# Patient Record
Sex: MALE | Race: BLACK OR AFRICAN AMERICAN | Employment: UNEMPLOYED | URBAN - METROPOLITAN AREA
[De-identification: names, ages, dates, MRNs, and addresses within clinical notes are randomized per-mention and may not be internally consistent; named-entity substitution may affect disease eponyms.]

---

## 2019-03-10 ENCOUNTER — HOSPITAL ENCOUNTER (EMERGENCY)
Age: 24
Discharge: HOME | End: 2019-03-10
Attending: EMERGENCY MEDICINE
Payer: SELF-PAY

## 2019-03-10 ENCOUNTER — APPOINTMENT (OUTPATIENT)
Dept: CT IMAGING | Age: 24
End: 2019-03-10
Attending: EMERGENCY MEDICINE
Payer: SELF-PAY

## 2019-03-10 VITALS
OXYGEN SATURATION: 100 % | HEART RATE: 83 BPM | TEMPERATURE: 99 F | RESPIRATION RATE: 15 BRPM | DIASTOLIC BLOOD PRESSURE: 74 MMHG | SYSTOLIC BLOOD PRESSURE: 129 MMHG

## 2019-03-10 DIAGNOSIS — S09.90XA CLOSED HEAD INJURY, INITIAL ENCOUNTER: ICD-10-CM

## 2019-03-10 DIAGNOSIS — R56.9 SEIZURE (HCC): Primary | ICD-10-CM

## 2019-03-10 LAB
ALBUMIN SERPL-MCNC: 4.1 G/DL (ref 3.5–5)
ALBUMIN/GLOB SERPL: 1 {RATIO} (ref 1.1–2.2)
ALP SERPL-CCNC: 53 U/L (ref 45–117)
ALT SERPL-CCNC: 40 U/L (ref 12–78)
AMPHET UR QL SCN: NEGATIVE
ANION GAP SERPL CALC-SCNC: 14 MMOL/L (ref 5–15)
APPEARANCE UR: CLEAR
AST SERPL-CCNC: 31 U/L (ref 15–37)
BACTERIA URNS QL MICRO: NEGATIVE /HPF
BARBITURATES UR QL SCN: NEGATIVE
BASOPHILS # BLD: 0 K/UL (ref 0–0.1)
BASOPHILS NFR BLD: 0 % (ref 0–1)
BENZODIAZ UR QL: NEGATIVE
BILIRUB SERPL-MCNC: 0.4 MG/DL (ref 0.2–1)
BILIRUB UR QL: NEGATIVE
BUN SERPL-MCNC: 11 MG/DL (ref 6–20)
BUN/CREAT SERPL: 10 (ref 12–20)
CALCIUM SERPL-MCNC: 9 MG/DL (ref 8.5–10.1)
CANNABINOIDS UR QL SCN: POSITIVE
CHLORIDE SERPL-SCNC: 108 MMOL/L (ref 97–108)
CK SERPL-CCNC: 205 U/L (ref 39–308)
CO2 SERPL-SCNC: 18 MMOL/L (ref 21–32)
COCAINE UR QL SCN: NEGATIVE
COLOR UR: ABNORMAL
CREAT SERPL-MCNC: 1.12 MG/DL (ref 0.7–1.3)
DIFFERENTIAL METHOD BLD: ABNORMAL
DRUG SCRN COMMENT,DRGCM: ABNORMAL
EOSINOPHIL # BLD: 0.1 K/UL (ref 0–0.4)
EOSINOPHIL NFR BLD: 1 % (ref 0–7)
EPITH CASTS URNS QL MICRO: ABNORMAL /LPF
ERYTHROCYTE [DISTWIDTH] IN BLOOD BY AUTOMATED COUNT: 12.8 % (ref 11.5–14.5)
GLOBULIN SER CALC-MCNC: 4.1 G/DL (ref 2–4)
GLUCOSE SERPL-MCNC: 109 MG/DL (ref 65–100)
GLUCOSE UR STRIP.AUTO-MCNC: NEGATIVE MG/DL
HCT VFR BLD AUTO: 47.4 % (ref 36.6–50.3)
HGB BLD-MCNC: 15.8 G/DL (ref 12.1–17)
HGB UR QL STRIP: ABNORMAL
HYALINE CASTS URNS QL MICRO: ABNORMAL /LPF (ref 0–5)
IMM GRANULOCYTES # BLD AUTO: 0 K/UL (ref 0–0.04)
IMM GRANULOCYTES NFR BLD AUTO: 0 % (ref 0–0.5)
KETONES UR QL STRIP.AUTO: NEGATIVE MG/DL
LEUKOCYTE ESTERASE UR QL STRIP.AUTO: NEGATIVE
LYMPHOCYTES # BLD: 3.1 K/UL (ref 0.8–3.5)
LYMPHOCYTES NFR BLD: 48 % (ref 12–49)
MAGNESIUM SERPL-MCNC: 2.4 MG/DL (ref 1.6–2.4)
MCH RBC QN AUTO: 27.9 PG (ref 26–34)
MCHC RBC AUTO-ENTMCNC: 33.3 G/DL (ref 30–36.5)
MCV RBC AUTO: 83.6 FL (ref 80–99)
METAMYELOCYTES NFR BLD MANUAL: 1 %
METHADONE UR QL: NEGATIVE
MONOCYTES # BLD: 0.1 K/UL (ref 0–1)
MONOCYTES NFR BLD: 2 % (ref 5–13)
MYELOCYTES NFR BLD MANUAL: 2 %
NEUTS SEG # BLD: 2.9 K/UL (ref 1.8–8)
NEUTS SEG NFR BLD: 46 % (ref 32–75)
NITRITE UR QL STRIP.AUTO: NEGATIVE
NRBC # BLD: 0 K/UL (ref 0–0.01)
NRBC BLD-RTO: 0 PER 100 WBC
OPIATES UR QL: NEGATIVE
PCP UR QL: NEGATIVE
PH UR STRIP: 5.5 [PH] (ref 5–8)
PLATELET # BLD AUTO: 355 K/UL (ref 150–400)
PMV BLD AUTO: 9 FL (ref 8.9–12.9)
POTASSIUM SERPL-SCNC: 4.5 MMOL/L (ref 3.5–5.1)
PROT SERPL-MCNC: 8.2 G/DL (ref 6.4–8.2)
PROT UR STRIP-MCNC: 30 MG/DL
RBC # BLD AUTO: 5.67 M/UL (ref 4.1–5.7)
RBC #/AREA URNS HPF: ABNORMAL /HPF (ref 0–5)
RBC MORPH BLD: ABNORMAL
SODIUM SERPL-SCNC: 140 MMOL/L (ref 136–145)
SP GR UR REFRACTOMETRY: 1.02 (ref 1–1.03)
TROPONIN I SERPL-MCNC: <0.05 NG/ML
UROBILINOGEN UR QL STRIP.AUTO: 1 EU/DL (ref 0.2–1)
WBC # BLD AUTO: 6.4 K/UL (ref 4.1–11.1)
WBC URNS QL MICRO: ABNORMAL /HPF (ref 0–4)

## 2019-03-10 PROCEDURE — 83735 ASSAY OF MAGNESIUM: CPT

## 2019-03-10 PROCEDURE — 85025 COMPLETE CBC W/AUTO DIFF WBC: CPT

## 2019-03-10 PROCEDURE — 84484 ASSAY OF TROPONIN QUANT: CPT

## 2019-03-10 PROCEDURE — 82550 ASSAY OF CK (CPK): CPT

## 2019-03-10 PROCEDURE — 99284 EMERGENCY DEPT VISIT MOD MDM: CPT

## 2019-03-10 PROCEDURE — 80053 COMPREHEN METABOLIC PANEL: CPT

## 2019-03-10 PROCEDURE — 70450 CT HEAD/BRAIN W/O DYE: CPT

## 2019-03-10 PROCEDURE — 81001 URINALYSIS AUTO W/SCOPE: CPT

## 2019-03-10 PROCEDURE — 96360 HYDRATION IV INFUSION INIT: CPT

## 2019-03-10 PROCEDURE — 36415 COLL VENOUS BLD VENIPUNCTURE: CPT

## 2019-03-10 PROCEDURE — 80307 DRUG TEST PRSMV CHEM ANLYZR: CPT

## 2019-03-10 PROCEDURE — 74011250636 HC RX REV CODE- 250/636: Performed by: EMERGENCY MEDICINE

## 2019-03-10 RX ADMIN — SODIUM CHLORIDE 1000 ML: 900 INJECTION, SOLUTION INTRAVENOUS at 13:13

## 2019-03-10 NOTE — ED NOTES
Pt presents to ED via EMS c/o \"6 minute\" first time seizure witnessed by his family. Pt was sitting ont he couch and slid to the floor during seizure. Pt c/o \"viral symptoms\" and one episode of nausea last week but other that has no other medical hx. Pt c/o posterior left sided head pain. Monitor x 3. Seizure precautions in place.

## 2019-03-10 NOTE — ED PROVIDER NOTES
EMERGENCY DEPARTMENT HISTORY AND PHYSICAL EXAM      Date: 3/10/2019  Patient Name: Sari Knox    History of Presenting Illness     Chief Complaint   Patient presents with    Seizure       History Provided By: Patient and Patient's Mother    HPI: Sari Knox, 21 y.o. male presents via EMS to the ED with cc of acute onset seizure, new onset today, witnessed by mother at pt's household ~1 hour pta. Mother reports pt fell on floor, began shaking his head and banging in on floor, and tremors radiated to lower extremities. Mother confirms pt bit tongue during episode. Pt reports feeling normal prior to episode. Pt reports he had abdominal pain with nausea/vomiting, onset 03/03/2019, and has had intermittent abdominal pain throughout past week. Pt reports having diarrhea on 03/09/2019. Pt also c/o 3/10 posterior HA, onset s/p seizure. He also reports having mild nausea, but pt thinks it is related to past abdominal pain and not seizure. Mother reports it took pt several minutes to become A&O x4 once again after seizure. Pt reports he smokes cigarettes every day. Pt denies neck pain, numbness/tingling, dizziness, lightheadedness, or blood thinner use. There are no other complaints, changes, or physical findings at this time. PCP: None    No current facility-administered medications on file prior to encounter. No current outpatient medications on file prior to encounter. Past History     Past Medical History:  No past medical history on file. Past Surgical History:  No past surgical history on file. Family History:  No family history on file. Social History:  Social History     Tobacco Use    Smoking status: Not on file   Substance Use Topics    Alcohol use: Not on file    Drug use: Not on file       Allergies:  No Known Allergies      Review of Systems   Review of Systems   Constitutional: Negative for fever. HENT: Negative for congestion. Eyes: Negative.     Respiratory: Negative for shortness of breath. Cardiovascular: Negative for chest pain. Gastrointestinal: Negative for abdominal pain. Endocrine: Negative for heat intolerance. Genitourinary: Negative for flank pain. Musculoskeletal: Negative for neck pain. Skin: Negative for rash. Allergic/Immunologic: Negative for immunocompromised state. Neurological: Positive for seizures and headaches. Negative for dizziness, light-headedness and numbness. Hematological: Does not bruise/bleed easily. Psychiatric/Behavioral: Negative. All other systems reviewed and are negative. Physical Exam   Physical Exam   Constitutional: He is oriented to person, place, and time. He appears well-developed and well-nourished. No distress. No acute distress   HENT:   Head: Normocephalic and atraumatic. Evidence tongue biting on left side   Eyes: EOM are normal. Pupils are equal, round, and reactive to light. Neck: Normal range of motion. Neck supple. Cardiovascular: Regular rhythm and normal heart sounds. Tachycardia present. Pulmonary/Chest: Effort normal and breath sounds normal. He has no wheezes. Abdominal: Soft. Bowel sounds are normal. There is no tenderness. Musculoskeletal: Normal range of motion. He exhibits no edema or tenderness. Neck non-tender; left occipital tenderness   Neurological: He is alert and oriented to person, place, and time. No cranial nerve deficit. Sensory and motor skills intact   Skin: Skin is warm and dry. Psychiatric: He has a normal mood and affect. His behavior is normal.   Nursing note and vitals reviewed.       Diagnostic Study Results     Labs -     Recent Results (from the past 12 hour(s))   CBC WITH AUTOMATED DIFF    Collection Time: 03/10/19 12:49 PM   Result Value Ref Range    WBC 6.4 4.1 - 11.1 K/uL    RBC 5.67 4.10 - 5.70 M/uL    HGB 15.8 12.1 - 17.0 g/dL    HCT 47.4 36.6 - 50.3 %    MCV 83.6 80.0 - 99.0 FL    MCH 27.9 26.0 - 34.0 PG    MCHC 33.3 30.0 - 36.5 g/dL RDW 12.8 11.5 - 14.5 %    PLATELET 257 780 - 768 K/uL    MPV 9.0 8.9 - 12.9 FL    NRBC 0.0 0  WBC    ABSOLUTE NRBC 0.00 0.00 - 0.01 K/uL    NEUTROPHILS 46 32 - 75 %    LYMPHOCYTES 48 12 - 49 %    MONOCYTES 2 (L) 5 - 13 %    EOSINOPHILS 1 0 - 7 %    BASOPHILS 0 0 - 1 %    METAMYELOCYTES 1 %    MYELOCYTES 2 %    IMMATURE GRANULOCYTES 0 0.0 - 0.5 %    ABS. NEUTROPHILS 2.9 1.8 - 8.0 K/UL    ABS. LYMPHOCYTES 3.1 0.8 - 3.5 K/UL    ABS. MONOCYTES 0.1 0.0 - 1.0 K/UL    ABS. EOSINOPHILS 0.1 0.0 - 0.4 K/UL    ABS. BASOPHILS 0.0 0.0 - 0.1 K/UL    ABS. IMM. GRANS. 0.0 0.00 - 0.04 K/UL    DF MANUAL      RBC COMMENTS NORMOCYTIC, NORMOCHROMIC     METABOLIC PANEL, COMPREHENSIVE    Collection Time: 03/10/19 12:49 PM   Result Value Ref Range    Sodium 140 136 - 145 mmol/L    Potassium 4.5 3.5 - 5.1 mmol/L    Chloride 108 97 - 108 mmol/L    CO2 18 (L) 21 - 32 mmol/L    Anion gap 14 5 - 15 mmol/L    Glucose 109 (H) 65 - 100 mg/dL    BUN 11 6 - 20 MG/DL    Creatinine 1.12 0.70 - 1.30 MG/DL    BUN/Creatinine ratio 10 (L) 12 - 20      GFR est AA >60 >60 ml/min/1.73m2    GFR est non-AA >60 >60 ml/min/1.73m2    Calcium 9.0 8.5 - 10.1 MG/DL    Bilirubin, total 0.4 0.2 - 1.0 MG/DL    ALT (SGPT) 40 12 - 78 U/L    AST (SGOT) 31 15 - 37 U/L    Alk.  phosphatase 53 45 - 117 U/L    Protein, total 8.2 6.4 - 8.2 g/dL    Albumin 4.1 3.5 - 5.0 g/dL    Globulin 4.1 (H) 2.0 - 4.0 g/dL    A-G Ratio 1.0 (L) 1.1 - 2.2     MAGNESIUM    Collection Time: 03/10/19 12:49 PM   Result Value Ref Range    Magnesium 2.4 1.6 - 2.4 mg/dL   CK    Collection Time: 03/10/19 12:49 PM   Result Value Ref Range     39 - 308 U/L   TROPONIN I    Collection Time: 03/10/19 12:49 PM   Result Value Ref Range    Troponin-I, Qt. <0.05 <0.05 ng/mL   URINALYSIS W/ RFLX MICROSCOPIC    Collection Time: 03/10/19  1:51 PM   Result Value Ref Range    Color YELLOW/STRAW      Appearance CLEAR CLEAR      Specific gravity 1.024 1.003 - 1.030      pH (UA) 5.5 5.0 - 8.0      Protein 30 (A) NEG mg/dL    Glucose NEGATIVE  NEG mg/dL    Ketone NEGATIVE  NEG mg/dL    Bilirubin NEGATIVE  NEG      Blood TRACE (A) NEG      Urobilinogen 1.0 0.2 - 1.0 EU/dL    Nitrites NEGATIVE  NEG      Leukocyte Esterase NEGATIVE  NEG      WBC 0-4 0 - 4 /hpf    RBC 0-5 0 - 5 /hpf    Epithelial cells FEW FEW /lpf    Bacteria NEGATIVE  NEG /hpf    Hyaline cast 2-5 0 - 5 /lpf   DRUG SCREEN, URINE    Collection Time: 03/10/19  1:51 PM   Result Value Ref Range    AMPHETAMINES NEGATIVE  NEG      BARBITURATES NEGATIVE  NEG      BENZODIAZEPINES NEGATIVE  NEG      COCAINE NEGATIVE  NEG      METHADONE NEGATIVE  NEG      OPIATES NEGATIVE  NEG      PCP(PHENCYCLIDINE) NEGATIVE  NEG      THC (TH-CANNABINOL) POSITIVE (A) NEG      Drug screen comment (NOTE)        Radiologic Studies -   CT HEAD WO CONT   Final Result   IMPRESSION: No acute intracranial abnormality. CT Results  (Last 48 hours)               03/10/19 1335  CT HEAD WO CONT Final result    Impression:  IMPRESSION: No acute intracranial abnormality. Narrative:  EXAM: CT HEAD WO CONT       INDICATION: Seizure, new, 22-41 yo, no trauma. First time seizure witnessed by   family. Patient was sitting on the couch and slid to the floor during seizure,   recent viral symptoms with nausea. Posterior left-sided head pain. COMPARISON: None. CONTRAST: None. TECHNIQUE: Unenhanced CT of the head was performed using 5 mm images. Brain and   bone windows were generated. CT dose reduction was achieved through use of a   standardized protocol tailored for this examination and automatic exposure   control for dose modulation. FINDINGS:   The ventricles and sulci are normal in size, shape and configuration and   midline. There is no significant white matter disease. There is no intracranial   hemorrhage, extra-axial collection, mass, mass effect or midline shift. The   basilar cisterns are open. No acute infarct is identified.  The bone windows   demonstrate no abnormalities. The visualized portions of the paranasal sinuses   and mastoid air cells are clear. Medical Decision Making   I am the first provider for this patient. I reviewed the vital signs, available nursing notes, past medical history, past surgical history, family history and social history. Vital Signs-Reviewed the patient's vital signs. Patient Vitals for the past 12 hrs:   Temp Pulse Resp BP SpO2   03/10/19 1430 -- 83 15 129/74 100 %   03/10/19 1239 99 °F (37.2 °C) (!) 102 21 123/74 100 %       Pulse Oximetry Analysis - 100% on RA    Cardiac Monitor:   Rate: 102 bpm  Rhythm: Sinus Tachycardia 1239     Records Reviewed: Nursing Notes, Old Medical Records and Ambulance Run Sheet    Provider Notes (Medical Decision Making):   DDx: new onset seizure, electrolyte abnormality, intracranial hemorrhage, close head injury, dehydration    ED Course:   Initial assessment performed. The patients presenting problems have been discussed, and they are in agreement with the care plan formulated and outlined with them. I have encouraged them to ask questions as they arise throughout their visit. Consult Note:  2:11 PM  Stone Loving MD spoke with Rashaun Mena MD,  Specialty: Neurology  Discussed pt's hx, disposition, and available diagnostic and imaging results. Reviewed care plans. Consultant agrees with plans as outlined. Dr. Rashaun Mena recommended pt receive outpatient MRI and EEG. Pt is not allowed to drive and need to obtain urine drug screen. Disposition:  Discharge Note:  2:41 PM  The pt is ready for discharge. The pt's signs, symptoms, diagnosis, and discharge instructions have been discussed and pt has conveyed their understanding. The pt is to follow up as recommended or return to ER should their symptoms worsen. Plan has been discussed and pt is in agreement. PLAN:  1. There are no discharge medications for this patient.     2.   Follow-up Information Follow up With Specialties Details Why Contact Info    Wander Kauffman MD Neurology Call in 1 day  [facility]. [address] [phone]         No driving or operating heavy machinery until cleared by Neurology     [facility] EMERGENCY DE[facility] Emergency Medicine  If symptoms worsen [address] [place] [address]  [phone]        Return to ED if worse     Diagnosis     Clinical Impression:   1. Seizure ([address]    2. Closed head injury, initial encounter        Attestations: This note is prepared by Yoselin Carolina, acting as Scribe for MD Paula Yusuf MD: The scribe's documentation has been prepared under my direction and personally reviewed by me in its entirety. I confirm that the note above accurately reflects all work, treatment, procedures, and medical decision making performed by me.

## 2019-03-10 NOTE — LETTER
NOTIFICATION OF RETURN TO WORK / SCHOOL 
 
3/10/2019 2:48 PM 
 
Mr. Andrew Jimenez [address] [facility] [id number] [place] [address] To Whom It May Concern: 
 
Andrew Jimenez was under the care of [facility] EMERGENCY DEPT on 3/10/2019. He will be able to return to work/school on 3/12/2019. If there are questions or concerns please have the patient contact our office. Sincerely, Magdalena Rios RN

## 2019-03-10 NOTE — DISCHARGE INSTRUCTIONS
Patient Education        Seizure: Care Instructions  Your Care Instructions    Seizures are caused by abnormal patterns of electrical signals in the brain. They are different for each person. Seizures can affect movement, speech, vision, or awareness. Some people have only slight shaking of a hand and do not pass out. Other people may pass out and have violent shaking of the whole body. Some people appear to stare into space. They are awake, but they can't respond normally. Later, they may not remember what happened. You may need tests to identify the type and cause of the seizures. A seizure may occur only once, or you may have them more than one time. Taking medicines as directed and following up with your doctor may help keep you from having more seizures. The doctor has checked you carefully, but problems can develop later. If you notice any problems or new symptoms, get medical treatment right away. Follow-up care is a key part of your treatment and safety. Be sure to make and go to all appointments, and call your doctor if you are having problems. It's also a good idea to know your test results and keep a list of the medicines you take. How can you care for yourself at home? · Be safe with medicines. Take your medicines exactly as prescribed. Call your doctor if you think you are having a problem with your medicine. · Do not do any activity that could be dangerous to you or others until your doctor says it is safe to do so. For example, do not drive a car, operate machinery, swim, or climb ladders. · Be sure that anyone treating you for any health problem knows that you have had a seizure and what medicines you are taking for it. · Identify and avoid things that may make you more likely to have a seizure. These may include lack of sleep, alcohol or drug use, stress, or not eating. · Make sure you go to your follow-up appointment. When should you call for help?   Call 911 anytime you think you may need emergency care. For example, call if:    · You have another seizure.     · You have more than one seizure in 24 hours.     · You have new symptoms, such as trouble walking, speaking, or thinking clearly.    Call your doctor now or seek immediate medical care if:    · You are not acting normally.    Watch closely for changes in your health, and be sure to contact your doctor if you have any problems. Where can you learn more? Go to http://luz-sadie.info/. Enter O406 in the search box to learn more about \"Seizure: Care Instructions. \"  Current as of: Aster 3, 2018  Content Version: 11.9  © 3584-6266 [employer] Care instructions adapted under license by [employer] (which disclaims liability or warranty for this information). If you have questions about a medical condition or this instruction, always ask your healthcare professional. [address] any warranty or liability for your use of this information. Patient Education        Learning About a Closed Head Injury  What is a closed head injury? A closed head injury happens when your head gets hit hard. The strong force of the blow causes your brain to shake in your skull. This movement can cause the brain to bruise, swell, or tear. Sometimes nerves or blood vessels also get damaged. This can cause bleeding in or around the brain. A concussion is a type of closed head injury. What are the symptoms? If you have a mild concussion, you may have a mild headache or feel \"not quite right. \" These symptoms are common. They usually go away over a few days to 4 weeks. But sometimes after a concussion, you feel like you can't function as well as before the injury. And you have new symptoms. This is called postconcussive syndrome. You may:  · Find it harder to solve problems, think, concentrate, or remember. · Have headaches.   · Have changes in your sleep patterns, such as not being able to sleep or sleeping all the time. · Have changes in your personality. · Not be interested in your usual activities. · Feel angry or anxious without a clear reason. · Lose your sense of taste or smell. · Be dizzy, lightheaded, or unsteady. It may be hard to stand or walk. How is a closed head injury treated? Any person who may have a concussion needs to see a doctor. Some people have to stay in the hospital to be watched. Others can go home safely. If you go home, follow your doctor's instructions. He or she will tell you if you need someone to watch you closely for the next 24 hours or longer. Rest is the best treatment. Get plenty of sleep at night. And try to rest during the day. · Avoid activities that are physically or mentally demanding. These include housework, exercise, and schoolwork. And don't play video games, send text messages, or use the computer. You may need to change your school or work schedule to be able to avoid these activities. · Ask your doctor when it's okay to drive, ride a bike, or operate machinery. · Take an over-the-counter pain medicine, such as acetaminophen (Tylenol), ibuprofen (Advil, Motrin), or naproxen (Aleve). Be safe with medicines. Read and follow all instructions on the label. · Check with your doctor before you use any other medicines for pain. · Do not drink alcohol or use illegal drugs. They can slow recovery. They can also increase your risk of getting a second head injury. Follow-up care is a key part of your treatment and safety. Be sure to make and go to all appointments, and call your doctor if you are having problems. It's also a good idea to know your test results and keep a list of the medicines you take. Where can you learn more? Go to http://luz-sadie.info/. Enter E235 in the search box to learn more about \"Learning About a Closed Head Injury. \"  Current as of: Aster 3, 2018  Content Version: 11.9  © 0130-6735 [employer]. Care instructions adapted under license by "[employer] (which disclaims liability or warranty for this information). If you have questions about a medical condition or this instruction, always ask your healthcare professional. [address] any warranty or liability for your use of this information.

## 2019-04-05 ENCOUNTER — OFFICE VISIT (OUTPATIENT)
Dept: NEUROLOGY | Age: 24
End: 2019-04-05

## 2019-04-05 VITALS
WEIGHT: 178.2 LBS | HEART RATE: 78 BPM | BODY MASS INDEX: 27.01 KG/M2 | DIASTOLIC BLOOD PRESSURE: 76 MMHG | HEIGHT: 68 IN | SYSTOLIC BLOOD PRESSURE: 118 MMHG | OXYGEN SATURATION: 98 %

## 2019-04-05 DIAGNOSIS — R56.9 SEIZURE (HCC): Primary | ICD-10-CM

## 2019-04-05 DIAGNOSIS — F41.9 ANXIETY: ICD-10-CM

## 2019-04-05 RX ORDER — DIPHENHYDRAMINE HCL 25 MG
25 TABLET ORAL
COMMUNITY
End: 2020-04-14

## 2019-04-05 NOTE — PROGRESS NOTES
NEUROLOGY HISTORY AND PHYSICAL    Name Maynor Adames Age 21 y.o. MRN [id number]  1995     Referring Physician:     Chief Complaint:  seizures     This is a 21 y.o. Male who had a new onset 6 minute followed by a one minute generalized tonic clonic seizure with no lucid interval. He bit his tongue. He does not remember the incident. He speculates that may have had other seizures because he remembers incidents where he \"blacked out\" where he is aware but does not have control of his body. No family history of seizures. Assessment and Plan  1. First onset seizure. Will need MRI   Will need EEG    2. Anxiety      Patient Allergies  Patient has no known allergies. Current Outpatient Medications   Medication Sig    diphenhydrAMINE (BENADRYL) 25 mg tablet Take 25 mg by mouth every six (6) hours as needed for Sleep. No current facility-administered medications for this visit. Past Medical History:   Diagnosis Date    Epilepsy [facility])        Social History     Tobacco Use    Smoking status: Never Smoker    Smokeless tobacco: Never Used   Substance Use Topics    Alcohol use: Not Currently       Family History   Problem Relation Age of Onset    Cancer Maternal Grandmother      Review of Systems   Constitutional: Negative for chills and fever. HENT: Negative for ear pain. Eyes: Negative for pain and discharge. Respiratory: Negative for cough and hemoptysis. Cardiovascular: Negative for chest pain and claudication. Gastrointestinal: Negative for constipation and diarrhea. Genitourinary: Negative for flank pain and hematuria. Musculoskeletal: Negative for back pain and myalgias. Skin: Negative for itching and rash. Neurological: Negative for headaches. Endo/Heme/Allergies: Negative for environmental allergies. Does not bruise/bleed easily. Psychiatric/Behavioral: Negative for depression and hallucinations.          Exam  Visit Vitals  /76   Pulse 78   Ht 5' 8\" (1.727 m)   Wt 178 lb 3.2 oz (80.8 kg)   SpO2 98%   BMI 27.10 kg/m²      General: Well developed, well nourished. Patient in no apparent distress   Head: Normocephalic, atraumatic, anicteric sclera   Neck Normal ROM, No thyromegally   Lungs:  Clear to auscultation bilaterally, No wheezes or rubs   Cardiac: Regular rate and rhythm with no murmurs. Abd: Bowel sounds were audible. No tenderness on palpation   Ext: No pedal edema   Skin: Supple no rash     NeurologicExam:  Mental Status: Alert and oriented to person place and time   Speech: Fluent no aphasia or dysarthria. Cranial Nerves:  II - XII Intact   Motor:  Full and symmetric strength of upper and lower proximal and distal muscles. Normal bulk and tone. Reflexes:   Deep tendon reflexes 2+/4 and symmetric. Sensory:   Symmetric and intact with no perceived deficits modalities involving small or large fibers. Gait:  Gait is balanced and fluid with normal arm swing. Tremor:   No tremor noted. Cerebellar:  Coordination intact. Neurovascular: No carotid bruits. No JVD       Imaging  MRI Results (most recent):  No results found for this or any previous visit. CT Results (most recent):  Results from Hospital Encounter encounter on 03/10/19   CT HEAD WO CONT    Narrative EXAM: CT HEAD WO CONT    INDICATION: Seizure, new, 22-39 yo, no trauma. First time seizure witnessed by  family. Patient was sitting on the couch and slid to the floor during seizure,  recent viral symptoms with nausea. Posterior left-sided head pain. COMPARISON: None. CONTRAST: None. TECHNIQUE: Unenhanced CT of the head was performed using 5 mm images. Brain and  bone windows were generated. CT dose reduction was achieved through use of a  standardized protocol tailored for this examination and automatic exposure  control for dose modulation. FINDINGS:  The ventricles and sulci are normal in size, shape and configuration and  midline.  There is no significant white matter disease. There is no intracranial  hemorrhage, extra-axial collection, mass, mass effect or midline shift. The  basilar cisterns are open. No acute infarct is identified. The bone windows  demonstrate no abnormalities. The visualized portions of the paranasal sinuses  and mastoid air cells are clear. Impression IMPRESSION: No acute intracranial abnormality.            Lab Review  Lab Results   Component Value Date/Time    WBC 6.4 03/10/2019 12:49 PM    HCT 47.4 03/10/2019 12:49 PM    HGB 15.8 03/10/2019 12:49 PM    PLATELET 265 55/68/3473 12:49 PM     Lab Results   Component Value Date/Time    Sodium 140 03/10/2019 12:49 PM    Potassium 4.5 03/10/2019 12:49 PM    Chloride 108 03/10/2019 12:49 PM    CO2 18 (L) 03/10/2019 12:49 PM    Glucose 109 (H) 03/10/2019 12:49 PM    BUN 11 03/10/2019 12:49 PM    Creatinine 1.12 03/10/2019 12:49 PM    Calcium 9.0 03/10/2019 12:49 PM

## 2019-11-01 ENCOUNTER — OFFICE VISIT (OUTPATIENT)
Dept: NEUROLOGY | Age: 24
End: 2019-11-01

## 2019-11-01 VITALS
SYSTOLIC BLOOD PRESSURE: 112 MMHG | HEIGHT: 68 IN | BODY MASS INDEX: 27.1 KG/M2 | OXYGEN SATURATION: 98 % | DIASTOLIC BLOOD PRESSURE: 64 MMHG | HEART RATE: 88 BPM

## 2019-11-01 DIAGNOSIS — R56.9 SEIZURE (HCC): Primary | ICD-10-CM

## 2019-11-01 DIAGNOSIS — F41.9 ANXIETY: ICD-10-CM

## 2019-11-01 NOTE — PROGRESS NOTES
Name: Salvador Mishra    Chief Complaint: SEIZURE    Could not get care card so did not get MRI and EEG. He has not had a seizure since his orignal event. He has been doing well other than being anxious and over thinking things. Assesment and Plan  1. Seizure  Needs MRI and EEG    2. Anxiety    Allergies  Patient has no known allergies. Medications  Current Outpatient Medications   Medication Sig    fexofenadine HCl (ALLEGRA PO) Take 20 mg by mouth.  diphenhydrAMINE (BENADRYL) 25 mg tablet Take 25 mg by mouth every six (6) hours as needed for Sleep. No current facility-administered medications for this visit. Medical History  Past Medical History:   Diagnosis Date    Epilepsy [facility])      Review of Systems   Constitutional: Negative for chills and fever. HENT: Negative for ear pain. Eyes: Negative for pain and discharge. Respiratory: Negative for cough and hemoptysis. Cardiovascular: Negative for chest pain and claudication. Gastrointestinal: Negative for constipation and diarrhea. Genitourinary: Negative for flank pain and hematuria. Musculoskeletal: Negative for back pain and myalgias. Skin: Negative for itching and rash. Neurological: Negative for headaches. Endo/Heme/Allergies: Negative for environmental allergies. Does not bruise/bleed easily. Psychiatric/Behavioral: Negative for depression and hallucinations. The patient is nervous/anxious. Exam:  Visit Vitals  /64   Pulse 88   Ht 5' 8\" (1.727 m)   SpO2 98%   BMI 27.10 kg/m²      General: Well developed, well nourished. Patient in no apparent distress   Head: Normocephalic, atraumatic, anicteric sclera   Neck Normal ROM, No thyromegally   Cardiac: Regular rate and rhythm   Ext: No pedal edema   Skin: Supple no rash     NeurologicExam:  Mental Status: Alert and oriented to person place and time   Speech: Fluent no aphasia or dysarthria.    Cranial Nerves:  II - XII Intact   Motor:  Full and symmetric strength of upper and lower proximal and distal muscles. Normal bulk and tone. Sensory:   Symmetric and intact with no perceived deficits modalities involving small or large fibers. Gait:  Gait is balanced and fluid with normal arm swing. Tremor:   No tremor noted. Cerebellar:  Coordination intact. Imaging  CT Results (most recent):  Results from Hospital Encounter encounter on 03/10/19   CT HEAD WO CONT    Narrative EXAM: CT HEAD WO CONT    INDICATION: Seizure, new, 22-41 yo, no trauma. First time seizure witnessed by  family. Patient was sitting on the couch and slid to the floor during seizure,  recent viral symptoms with nausea. Posterior left-sided head pain. COMPARISON: None. CONTRAST: None. TECHNIQUE: Unenhanced CT of the head was performed using 5 mm images. Brain and  bone windows were generated. CT dose reduction was achieved through use of a  standardized protocol tailored for this examination and automatic exposure  control for dose modulation. FINDINGS:  The ventricles and sulci are normal in size, shape and configuration and  midline. There is no significant white matter disease. There is no intracranial  hemorrhage, extra-axial collection, mass, mass effect or midline shift. The  basilar cisterns are open. No acute infarct is identified. The bone windows  demonstrate no abnormalities. The visualized portions of the paranasal sinuses  and mastoid air cells are clear. Impression IMPRESSION: No acute intracranial abnormality. MRI Results (most recent):  No results found for this or any previous visit.       Lab Review  Lab Results   Component Value Date/Time    WBC 6.4 03/10/2019 12:49 PM    HCT 47.4 03/10/2019 12:49 PM    HGB 15.8 03/10/2019 12:49 PM    PLATELET 276 25/14/2675 12:49 PM       Lab Results   Component Value Date/Time    Sodium 140 03/10/2019 12:49 PM    Potassium 4.5 03/10/2019 12:49 PM    Chloride 108 03/10/2019 12:49 PM    CO2 18 (L) 03/10/2019 12:49 PM    Glucose 109 (H) 03/10/2019 12:49 PM    BUN 11 03/10/2019 12:49 PM    Creatinine 1.12 03/10/2019 12:49 PM    Calcium 9.0 03/10/2019 12:49 PM

## 2020-04-14 ENCOUNTER — HOSPITAL ENCOUNTER (EMERGENCY)
Age: 25
Discharge: HOME | End: 2020-04-14
Attending: EMERGENCY MEDICINE
Payer: SELF-PAY

## 2020-04-14 ENCOUNTER — APPOINTMENT (OUTPATIENT)
Dept: CT IMAGING | Age: 25
End: 2020-04-14
Attending: EMERGENCY MEDICINE
Payer: SELF-PAY

## 2020-04-14 VITALS
OXYGEN SATURATION: 100 % | HEART RATE: 72 BPM | DIASTOLIC BLOOD PRESSURE: 84 MMHG | SYSTOLIC BLOOD PRESSURE: 141 MMHG | TEMPERATURE: 98.2 F | RESPIRATION RATE: 13 BRPM | BODY MASS INDEX: 27.93 KG/M2 | HEIGHT: 68 IN | WEIGHT: 184.3 LBS

## 2020-04-14 DIAGNOSIS — R10.9 RIGHT SIDED ABDOMINAL PAIN: Primary | ICD-10-CM

## 2020-04-14 DIAGNOSIS — F41.1 GENERALIZED ANXIETY DISORDER: ICD-10-CM

## 2020-04-14 LAB
ALBUMIN SERPL-MCNC: 4.5 G/DL (ref 3.5–5)
ALBUMIN/GLOB SERPL: 1.1 {RATIO} (ref 1.1–2.2)
ALP SERPL-CCNC: 79 U/L (ref 45–117)
ALT SERPL-CCNC: 32 U/L (ref 12–78)
ANION GAP SERPL CALC-SCNC: 4 MMOL/L (ref 5–15)
APPEARANCE UR: CLEAR
AST SERPL-CCNC: 24 U/L (ref 15–37)
ATRIAL RATE: 89 BPM
BACTERIA URNS QL MICRO: NEGATIVE /HPF
BASOPHILS # BLD: 0 K/UL (ref 0–0.1)
BASOPHILS NFR BLD: 0 % (ref 0–1)
BILIRUB SERPL-MCNC: 0.6 MG/DL (ref 0.2–1)
BILIRUB UR QL: NEGATIVE
BUN SERPL-MCNC: 10 MG/DL (ref 6–20)
BUN/CREAT SERPL: 8 (ref 12–20)
CALCIUM SERPL-MCNC: 9.9 MG/DL (ref 8.5–10.1)
CALCULATED P AXIS, ECG09: 68 DEGREES
CALCULATED R AXIS, ECG10: 69 DEGREES
CALCULATED T AXIS, ECG11: 38 DEGREES
CHLORIDE SERPL-SCNC: 105 MMOL/L (ref 97–108)
CO2 SERPL-SCNC: 28 MMOL/L (ref 21–32)
COLOR UR: ABNORMAL
CREAT SERPL-MCNC: 1.19 MG/DL (ref 0.7–1.3)
DIAGNOSIS, 93000: NORMAL
DIFFERENTIAL METHOD BLD: ABNORMAL
EOSINOPHIL # BLD: 0.1 K/UL (ref 0–0.4)
EOSINOPHIL NFR BLD: 1 % (ref 0–7)
EPITH CASTS URNS QL MICRO: ABNORMAL /LPF
ERYTHROCYTE [DISTWIDTH] IN BLOOD BY AUTOMATED COUNT: 12.3 % (ref 11.5–14.5)
GLOBULIN SER CALC-MCNC: 4.2 G/DL (ref 2–4)
GLUCOSE SERPL-MCNC: 93 MG/DL (ref 65–100)
GLUCOSE UR STRIP.AUTO-MCNC: NEGATIVE MG/DL
HCT VFR BLD AUTO: 44.6 % (ref 36.6–50.3)
HGB BLD-MCNC: 15.2 G/DL (ref 12.1–17)
HGB UR QL STRIP: ABNORMAL
IMM GRANULOCYTES # BLD AUTO: 0 K/UL (ref 0–0.04)
IMM GRANULOCYTES NFR BLD AUTO: 0 % (ref 0–0.5)
KETONES UR QL STRIP.AUTO: NEGATIVE MG/DL
LEUKOCYTE ESTERASE UR QL STRIP.AUTO: NEGATIVE
LIPASE SERPL-CCNC: 230 U/L (ref 73–393)
LYMPHOCYTES # BLD: 1.3 K/UL (ref 0.8–3.5)
LYMPHOCYTES NFR BLD: 23 % (ref 12–49)
MCH RBC QN AUTO: 27.7 PG (ref 26–34)
MCHC RBC AUTO-ENTMCNC: 34.1 G/DL (ref 30–36.5)
MCV RBC AUTO: 81.4 FL (ref 80–99)
MONOCYTES # BLD: 0.5 K/UL (ref 0–1)
MONOCYTES NFR BLD: 8 % (ref 5–13)
NEUTS SEG # BLD: 4 K/UL (ref 1.8–8)
NEUTS SEG NFR BLD: 68 % (ref 32–75)
NITRITE UR QL STRIP.AUTO: NEGATIVE
NRBC # BLD: 0 K/UL (ref 0–0.01)
NRBC BLD-RTO: 0 PER 100 WBC
P-R INTERVAL, ECG05: 154 MS
PH UR STRIP: 7 [PH] (ref 5–8)
PLATELET # BLD AUTO: 327 K/UL (ref 150–400)
PMV BLD AUTO: 8.8 FL (ref 8.9–12.9)
POTASSIUM SERPL-SCNC: 4 MMOL/L (ref 3.5–5.1)
PROT SERPL-MCNC: 8.7 G/DL (ref 6.4–8.2)
PROT UR STRIP-MCNC: NEGATIVE MG/DL
Q-T INTERVAL, ECG07: 318 MS
QRS DURATION, ECG06: 88 MS
QTC CALCULATION (BEZET), ECG08: 386 MS
RBC # BLD AUTO: 5.48 M/UL (ref 4.1–5.7)
RBC #/AREA URNS HPF: ABNORMAL /HPF (ref 0–5)
SODIUM SERPL-SCNC: 137 MMOL/L (ref 136–145)
SP GR UR REFRACTOMETRY: 1.01 (ref 1–1.03)
SPERM URNS QL MICRO: PRESENT
UROBILINOGEN UR QL STRIP.AUTO: 0.2 EU/DL (ref 0.2–1)
VENTRICULAR RATE, ECG03: 89 BPM
WBC # BLD AUTO: 5.9 K/UL (ref 4.1–11.1)
WBC URNS QL MICRO: ABNORMAL /HPF (ref 0–4)

## 2020-04-14 PROCEDURE — 74177 CT ABD & PELVIS W/CONTRAST: CPT

## 2020-04-14 PROCEDURE — 93005 ELECTROCARDIOGRAM TRACING: CPT

## 2020-04-14 PROCEDURE — 74011250636 HC RX REV CODE- 250/636: Performed by: EMERGENCY MEDICINE

## 2020-04-14 PROCEDURE — 80053 COMPREHEN METABOLIC PANEL: CPT

## 2020-04-14 PROCEDURE — 85025 COMPLETE CBC W/AUTO DIFF WBC: CPT

## 2020-04-14 PROCEDURE — 81001 URINALYSIS AUTO W/SCOPE: CPT

## 2020-04-14 PROCEDURE — 36415 COLL VENOUS BLD VENIPUNCTURE: CPT

## 2020-04-14 PROCEDURE — 74011636320 HC RX REV CODE- 636/320: Performed by: EMERGENCY MEDICINE

## 2020-04-14 PROCEDURE — 96375 TX/PRO/DX INJ NEW DRUG ADDON: CPT

## 2020-04-14 PROCEDURE — 96374 THER/PROPH/DIAG INJ IV PUSH: CPT

## 2020-04-14 PROCEDURE — 83690 ASSAY OF LIPASE: CPT

## 2020-04-14 PROCEDURE — 99285 EMERGENCY DEPT VISIT HI MDM: CPT

## 2020-04-14 RX ORDER — ACETAMINOPHEN, DEXTROMETHORPHAN HYDROBROMIDE, GUAIFENESIN, AND PHENYLEPHRINE HYDROCHLORIDE 650; 20; 400; 10 MG/20ML; MG/20ML; MG/20ML; MG/20ML
SOLUTION ORAL
COMMUNITY
End: 2022-01-06

## 2020-04-14 RX ORDER — SODIUM CHLORIDE 0.9 % (FLUSH) 0.9 %
10 SYRINGE (ML) INJECTION
Status: COMPLETED | OUTPATIENT
Start: 2020-04-14 | End: 2020-04-14

## 2020-04-14 RX ORDER — ACETAMINOPHEN 325 MG/1
TABLET ORAL
COMMUNITY
End: 2022-01-06

## 2020-04-14 RX ORDER — CETIRIZINE HYDROCHLORIDE 10 MG/1
CAPSULE, LIQUID FILLED ORAL
COMMUNITY
End: 2022-01-06

## 2020-04-14 RX ORDER — LORAZEPAM 2 MG/ML
1 INJECTION INTRAMUSCULAR
Status: COMPLETED | OUTPATIENT
Start: 2020-04-14 | End: 2020-04-14

## 2020-04-14 RX ORDER — ONDANSETRON 2 MG/ML
4 INJECTION INTRAMUSCULAR; INTRAVENOUS
Status: COMPLETED | OUTPATIENT
Start: 2020-04-14 | End: 2020-04-14

## 2020-04-14 RX ADMIN — LORAZEPAM 1 MG: 2 INJECTION INTRAMUSCULAR; INTRAVENOUS at 08:11

## 2020-04-14 RX ADMIN — IOPAMIDOL 100 ML: 755 INJECTION, SOLUTION INTRAVENOUS at 08:48

## 2020-04-14 RX ADMIN — ONDANSETRON 4 MG: 2 INJECTION INTRAMUSCULAR; INTRAVENOUS at 08:11

## 2020-04-14 RX ADMIN — SODIUM CHLORIDE 1000 ML: 900 INJECTION, SOLUTION INTRAVENOUS at 08:11

## 2020-04-14 RX ADMIN — Medication 10 ML: at 08:48

## 2020-04-14 NOTE — DISCHARGE INSTRUCTIONS

## 2020-04-14 NOTE — PROGRESS NOTES
Nurse Clarification of the Prior to Admission Medication Regimen     The patient was interviewed regarding clarification of the prior to admission medication regimen. The individual(s) listed above were questioned regarding the patient's use of any other inhalers, topical products, over the counter medications, herbal medications, vitamin products or ophthalmic/nasal/otic medication use. Information Obtained From: patient    Recommendations/Findings: The following amendments were made to the patient's active medication list on file at [facility]:     1) Additions:    Tylenol q 4h prn po   Zyrtec 10 mg po every day prn   mucinex cold, flu, sore throat 10 mg/120 mg-650mg 20 ml    2) Removals:    Benadryl 25 mg po q6h prn    3) Changes:   none       PTA medication list was corrected to the following:     Prior to Admission Medications   Prescriptions Last Dose Informant Taking? Cetirizine (ZyrTEC) 10 mg cap   Yes   Sig: Take  by mouth daily as needed. acetaminophen (TylenoL) 325 mg tablet   Yes   Sig: Take  by mouth every four (4) hours as needed for Pain. fexofenadine HCl (ALLEGRA PO)   No   Sig: Take 20 mg by mouth.   qmjqmmwlb-GW-gjufanwo-guaifen (Mucinex Cold,Flu,Sore Throat) 10- mg/20 mL liqd   Yes   Sig: Take  by mouth four (4) times daily as needed.       Facility-Administered Medications: None        Thank you,  Susy Carrasco

## 2020-04-14 NOTE — ED PROVIDER NOTES
EMERGENCY DEPARTMENT HISTORY AND PHYSICAL EXAM      Date: 4/14/2020  Patient Name: Weston Candelaria    History of Presenting Illness     Chief Complaint   Patient presents with    Chest Pain     onset this morning    Abdominal Pain     RLQ onset 04:30    Anxiety     chronic, not on meds    Nausea     onthis this morning       History Provided By: Patient    HPI: Weston Candelaria, 25 y.o. male presents to the ED with a history of prior seizure and anxiety presents today with onset of right-sided abdominal pain with nausea about 4:30 AM.  While I think my appendix is burst\". Patient states he is anxious but is worried about the right sided pain which he rates a 3 out of 10, sharp in quality and his abdomen feels \"like a deflated balloon\". Last BM today, reports no blood in stool or urine. No vomiting, no fever no cough or sore throat or URI symptoms. Patient says he does have some chest discomfort with it. Her history \"it was a one-time thing\". Patient states he is nauseous but again has not vomited. Denies any history of abdominal surgeries, kidney stones, gallbladder trouble or other concerns. Denies any trauma to the abdomen and denies any testicle pain or penis pain. There are no other complaints, changes, or physical findings at this time. PCP: None    No current facility-administered medications on file prior to encounter. Current Outpatient Medications on File Prior to Encounter   Medication Sig Dispense Refill    Cetirizine (ZyrTEC) 10 mg cap Take  by mouth daily as needed.  miwwmykzh-XE-vtnpidsm-guaifen (Mucinex Cold,Flu,Sore Throat) 10- mg/20 mL liqd Take  by mouth four (4) times daily as needed.  acetaminophen (TylenoL) 325 mg tablet Take  by mouth every four (4) hours as needed for Pain.  fexofenadine HCl (ALLEGRA PO) Take 20 mg by mouth.  [DISCONTINUED] diphenhydrAMINE (BENADRYL) 25 mg tablet Take 25 mg by mouth every six (6) hours as needed for Sleep. Past History     Past Medical History:  Past Medical History:   Diagnosis Date    Epilepsy [facility])        Past Surgical History:  History reviewed. No pertinent surgical history. Family History:  Family History   Problem Relation Age of Onset    Cancer Maternal Grandmother        Social History:  Social History     Tobacco Use    Smoking status: Never Smoker    Smokeless tobacco: Never Used   Substance Use Topics    Alcohol use: Not Currently    Drug use: Yes     Types: Marijuana       Allergies:  No Known Allergies      Review of Systems   Review of Systems   Constitutional: Negative for activity change, appetite change, fatigue, fever and unexpected weight change. HENT: Negative. Eyes: Negative. Respiratory: Negative. Cardiovascular: Positive for chest pain. Negative for palpitations and leg swelling. Gastrointestinal: Positive for abdominal pain and nausea. Negative for abdominal distention, anal bleeding, blood in stool, constipation, diarrhea, rectal pain and vomiting. Genitourinary: Negative for difficulty urinating, penile pain and testicular pain. Musculoskeletal: Negative for back pain and neck pain. Neurological: Negative for dizziness and syncope. Hematological: Negative for adenopathy. All other systems reviewed and are negative. Physical Exam   Physical Exam   Vital signs and nursing notes reviewed    CONSTITUTIONAL: Alert, in mild distress; well-developed; well-nourished. Appears anxious. HEAD:  Normocephalic, atraumatic  EYES: PERRL; EOM's intact. ENTM: Nose: no rhinorrhea; Throat: no erythema or exudate, mucous membranes moist  Neck:  Supple. trachea is midline. RESP: Chest clear, equal breath sounds. - W/R/R Trey Clam in full sentences. CV: S1 and S2 WNL; No murmurs, gallops or rubs. 2+ radial and DP pulses bilaterally. Heart rate 88 and regular.   GI: non-distended, normal bowel sounds, abdomen soft with tenderness in the right lower quadrant, pain with obturator externus testing. . No masses or organomegaly. : No costo-vertebral angle tenderness. BACK:  Non-tender, normal appearance  UPPER EXT:  Normal inspection. no joint or soft tissue swelling  LOWER EXT: No edema, no calf tenderness. NEURO: Alert and oriented x3, 5/5 strength and light touch sensation intact in bilateral upper and lower extremities. SKIN: No rashes; Warm and dry  PSYCH: Anxious mood and affect. Diagnostic Study Results     Labs -     Recent Results (from the past 12 hour(s))   EKG, 12 LEAD, INITIAL    Collection Time: 04/14/20  7:48 AM   Result Value Ref Range    Ventricular Rate 89 BPM    Atrial Rate 89 BPM    P-R Interval 154 ms    QRS Duration 88 ms    Q-T Interval 318 ms    QTC Calculation (Bezet) 386 ms    Calculated P Axis 68 degrees    Calculated R Axis 69 degrees    Calculated T Axis 38 degrees    Diagnosis       Normal sinus rhythm with sinus arrhythmia  Normal ECG  No previous ECGs available     CBC WITH AUTOMATED DIFF    Collection Time: 04/14/20  8:00 AM   Result Value Ref Range    WBC 5.9 4.1 - 11.1 K/uL    RBC 5.48 4.10 - 5.70 M/uL    HGB 15.2 12.1 - 17.0 g/dL    HCT 44.6 36.6 - 50.3 %    MCV 81.4 80.0 - 99.0 FL    MCH 27.7 26.0 - 34.0 PG    MCHC 34.1 30.0 - 36.5 g/dL    RDW 12.3 11.5 - 14.5 %    PLATELET 462 990 - 763 K/uL    MPV 8.8 (L) 8.9 - 12.9 FL    NRBC 0.0 0  WBC    ABSOLUTE NRBC 0.00 0.00 - 0.01 K/uL    NEUTROPHILS 68 32 - 75 %    LYMPHOCYTES 23 12 - 49 %    MONOCYTES 8 5 - 13 %    EOSINOPHILS 1 0 - 7 %    BASOPHILS 0 0 - 1 %    IMMATURE GRANULOCYTES 0 0.0 - 0.5 %    ABS. NEUTROPHILS 4.0 1.8 - 8.0 K/UL    ABS. LYMPHOCYTES 1.3 0.8 - 3.5 K/UL    ABS. MONOCYTES 0.5 0.0 - 1.0 K/UL    ABS. EOSINOPHILS 0.1 0.0 - 0.4 K/UL    ABS. BASOPHILS 0.0 0.0 - 0.1 K/UL    ABS. IMM.  GRANS. 0.0 0.00 - 0.04 K/UL    DF AUTOMATED     METABOLIC PANEL, COMPREHENSIVE    Collection Time: 04/14/20  8:00 AM   Result Value Ref Range    Sodium 137 136 - 145 mmol/L    Potassium 4.0 3.5 - 5.1 mmol/L    Chloride 105 97 - 108 mmol/L    CO2 28 21 - 32 mmol/L    Anion gap 4 (L) 5 - 15 mmol/L    Glucose 93 65 - 100 mg/dL    BUN 10 6 - 20 MG/DL    Creatinine 1.19 0.70 - 1.30 MG/DL    BUN/Creatinine ratio 8 (L) 12 - 20      GFR est AA >60 >60 ml/min/1.73m2    GFR est non-AA >60 >60 ml/min/1.73m2    Calcium 9.9 8.5 - 10.1 MG/DL    Bilirubin, total 0.6 0.2 - 1.0 MG/DL    ALT (SGPT) 32 12 - 78 U/L    AST (SGOT) 24 15 - 37 U/L    Alk. phosphatase 79 45 - 117 U/L    Protein, total 8.7 (H) 6.4 - 8.2 g/dL    Albumin 4.5 3.5 - 5.0 g/dL    Globulin 4.2 (H) 2.0 - 4.0 g/dL    A-G Ratio 1.1 1.1 - 2.2     LIPASE    Collection Time: 04/14/20  8:00 AM   Result Value Ref Range    Lipase 230 73 - 393 U/L   URINALYSIS W/ RFLX MICROSCOPIC    Collection Time: 04/14/20  8:12 AM   Result Value Ref Range    Color YELLOW/STRAW      Appearance CLEAR CLEAR      Specific gravity 1.015 1.003 - 1.030      pH (UA) 7.0 5.0 - 8.0      Protein Negative NEG mg/dL    Glucose Negative NEG mg/dL    Ketone Negative NEG mg/dL    Bilirubin Negative NEG      Blood TRACE (A) NEG      Urobilinogen 0.2 0.2 - 1.0 EU/dL    Nitrites Negative NEG      Leukocyte Esterase Negative NEG      WBC 0-4 0 - 4 /hpf    RBC 0-5 0 - 5 /hpf    Epithelial cells FEW FEW /lpf    Bacteria Negative NEG /hpf    Spermatozoa PRESENT         Radiologic Studies -   CT ABD PELV W CONT   Final Result   IMPRESSION: Normal appendix. CT Results  (Last 48 hours)               04/14/20 0847  CT ABD PELV W CONT Final result    Impression:  IMPRESSION: Normal appendix. Narrative:  INDICATION: Acute right lower quadrant pain, evaluate for appendicitis. CT of the abdomen and pelvis is performed with 5 mm collimation. Study is   performed with 100 cc of nonionic Isovue 370. Sagittal and coronal reformatted   images were also performed.        CT dose reduction was achieved with the use of the standardized protocol   tailored for this examination and automatic exposure control for dose   modulation. There is no prior study for direct comparison. Findings:       Lung bases: The visualized lung bases are clear. Liver: The liver is normal.       Adrenals: Adrenal glands are normal.       Pancreas: The pancreas is normal.       Gallbladder: The gallbladder is normal.       Kidneys: The kidneys are normal.       Spleen: The spleen is normal.       Lymph nodes. There is no kim hepatitis, mesenteric, retroperitoneal or pelvic   lymphadenopathy. Bowel: No thickened or dilated loop of large or small bowel is visualized. Appendix: The appendix is normal.       Urinary bladder: Urinary bladder is partially filled and grossly normal.       Miscellaneous: There is no free intraperitoneal fluid or gas. There is no focal   fluid collection to suggest abscess. CXR Results  (Last 48 hours)    None          Medical Decision Making   I am the first provider for this patient. I reviewed the vital signs, available nursing notes, past medical history, past surgical history, family history and social history. Vital Signs-Reviewed the patient's vital signs. Patient Vitals for the past 12 hrs:   Temp Pulse Resp BP SpO2   04/14/20 0915 -- 98 20 147/86 100 %   04/14/20 0900 -- 79 19 139/86 100 %   04/14/20 0845 -- -- -- 143/84 --   04/14/20 0815 -- 77 16 131/86 (!) 69 %   04/14/20 0800 -- 87 15 129/75 100 %   04/14/20 0745 -- 84 19 122/72 100 %   04/14/20 0730 98.4 °F (36.9 °C) 88 19 134/76 99 %         Records Reviewed: Nursing Notes and Old Medical Records    Provider Notes (Medical Decision Making):   80-year-old male with anxiety at baseline with anxiousness today who reports right lower quadrant pain with right lower quadrant pain on exam with positive obturator externus sign. Hemodynamically stable, will rule out appendicitis, consider renal stone, check urine.   Provide nausea medication, anxiolytic at patient's request.  If CT reassuring, plan for DC home. ED Course:   Initial assessment performed. The patients presenting problems have been discussed, and they are in agreement with the care plan formulated and outlined with them. I have encouraged them to ask questions as they arise throughout their visit. Disposition:  Discharge    Discharge Note:  7:48 AM  The pt is ready for discharge. The pt's signs, symptoms, diagnosis, and discharge instructions have been discussed and pt has conveyed their understanding. The pt is to follow up as recommended or return to ER should their symptoms worsen. Plan has been discussed and pt is in agreement. DISCHARGE PLAN:  1. Current Discharge Medication List        2. Follow-up Information    None       3. Return to ED if worse     Diagnosis     Clinical Impression:   1. Right sided abdominal pain    2. Generalized anxiety disorder        Attestations:    Oliver Mayorga MD    Please note that this dictation was completed with [employer], the computer voice recognition software. Quite often unanticipated grammatical, syntax, homophones, and other interpretive errors are inadvertently transcribed by the computer software. Please disregard these errors. Please excuse any errors that have escaped final proofreading. Thank you.

## 2020-04-14 NOTE — ED NOTES
Pt arrives to the ED via ambulance [id number] with a c/c of RLQ abd pain associated with nausea onset 04:30 today. Pt is stating he is nervous because he thinks his appendix \"burst.\" Pt noted holding his chest, endorses discomfort and states he suffers from anxiety. Pt is noted in stable condition, now in ED room with side rail up, bed to lowest position and call light within reach. EDP at bedside for evaluation, will continue to monitor.

## 2020-04-14 NOTE — ED NOTES
Discharge instructions provided. Pt verbalized understanding and has no further questions at this time. Pt leaving ED in company of family in stable condition.

## 2020-04-15 ENCOUNTER — PATIENT OUTREACH (OUTPATIENT)
Dept: CARDIOLOGY CLINIC | Age: 25
End: 2020-04-15

## 2020-04-17 ENCOUNTER — PATIENT OUTREACH (OUTPATIENT)
Dept: CARDIOLOGY CLINIC | Age: 25
End: 2020-04-17

## 2021-08-04 ENCOUNTER — APPOINTMENT (OUTPATIENT)
Dept: CT IMAGING | Age: 26
End: 2021-08-04
Attending: EMERGENCY MEDICINE
Payer: COMMERCIAL

## 2021-08-04 ENCOUNTER — HOSPITAL ENCOUNTER (EMERGENCY)
Age: 26
Discharge: HOME | End: 2021-08-04
Attending: EMERGENCY MEDICINE
Payer: COMMERCIAL

## 2021-08-04 VITALS
WEIGHT: 200 LBS | OXYGEN SATURATION: 97 % | RESPIRATION RATE: 20 BRPM | HEIGHT: 68 IN | BODY MASS INDEX: 30.31 KG/M2 | DIASTOLIC BLOOD PRESSURE: 79 MMHG | HEART RATE: 105 BPM | TEMPERATURE: 98.2 F | SYSTOLIC BLOOD PRESSURE: 138 MMHG

## 2021-08-04 DIAGNOSIS — R10.11 ABDOMINAL PAIN, RIGHT UPPER QUADRANT: Primary | ICD-10-CM

## 2021-08-04 LAB
ALBUMIN SERPL-MCNC: 4.4 G/DL (ref 3.5–5)
ALBUMIN/GLOB SERPL: 1.1 {RATIO} (ref 1.1–2.2)
ALP SERPL-CCNC: 58 U/L (ref 45–117)
ALT SERPL-CCNC: 34 U/L (ref 12–78)
ANION GAP SERPL CALC-SCNC: 4 MMOL/L (ref 5–15)
APPEARANCE UR: CLEAR
AST SERPL-CCNC: 27 U/L (ref 15–37)
BASOPHILS # BLD: 0 K/UL (ref 0–0.1)
BASOPHILS NFR BLD: 1 % (ref 0–1)
BILIRUB SERPL-MCNC: 0.7 MG/DL (ref 0.2–1)
BILIRUB UR QL: NEGATIVE
BUN SERPL-MCNC: 12 MG/DL (ref 6–20)
BUN/CREAT SERPL: 9 (ref 12–20)
CALCIUM SERPL-MCNC: 9.1 MG/DL (ref 8.5–10.1)
CHLORIDE SERPL-SCNC: 106 MMOL/L (ref 97–108)
CO2 SERPL-SCNC: 29 MMOL/L (ref 21–32)
COLOR UR: NORMAL
CREAT SERPL-MCNC: 1.32 MG/DL (ref 0.7–1.3)
DIFFERENTIAL METHOD BLD: NORMAL
EOSINOPHIL # BLD: 0.1 K/UL (ref 0–0.4)
EOSINOPHIL NFR BLD: 1 % (ref 0–7)
ERYTHROCYTE [DISTWIDTH] IN BLOOD BY AUTOMATED COUNT: 13 % (ref 11.5–14.5)
GLOBULIN SER CALC-MCNC: 4.1 G/DL (ref 2–4)
GLUCOSE SERPL-MCNC: 128 MG/DL (ref 65–100)
GLUCOSE UR STRIP.AUTO-MCNC: NEGATIVE MG/DL
HCT VFR BLD AUTO: 43.5 % (ref 36.6–50.3)
HGB BLD-MCNC: 14.9 G/DL (ref 12.1–17)
HGB UR QL STRIP: NEGATIVE
IMM GRANULOCYTES # BLD AUTO: 0 K/UL (ref 0–0.04)
IMM GRANULOCYTES NFR BLD AUTO: 0 % (ref 0–0.5)
KETONES UR QL STRIP.AUTO: NEGATIVE MG/DL
LEUKOCYTE ESTERASE UR QL STRIP.AUTO: NEGATIVE
LIPASE SERPL-CCNC: 136 U/L (ref 73–393)
LYMPHOCYTES # BLD: 1.6 K/UL (ref 0.8–3.5)
LYMPHOCYTES NFR BLD: 29 % (ref 12–49)
MCH RBC QN AUTO: 28.2 PG (ref 26–34)
MCHC RBC AUTO-ENTMCNC: 34.3 G/DL (ref 30–36.5)
MCV RBC AUTO: 82.4 FL (ref 80–99)
MONOCYTES # BLD: 0.4 K/UL (ref 0–1)
MONOCYTES NFR BLD: 8 % (ref 5–13)
NEUTS SEG # BLD: 3.3 K/UL (ref 1.8–8)
NEUTS SEG NFR BLD: 61 % (ref 32–75)
NITRITE UR QL STRIP.AUTO: NEGATIVE
NRBC # BLD: 0 K/UL (ref 0–0.01)
NRBC BLD-RTO: 0 PER 100 WBC
PH UR STRIP: 6.5 [PH] (ref 5–8)
PLATELET # BLD AUTO: 329 K/UL (ref 150–400)
PMV BLD AUTO: 9.2 FL (ref 8.9–12.9)
POTASSIUM SERPL-SCNC: 4.1 MMOL/L (ref 3.5–5.1)
PROT SERPL-MCNC: 8.5 G/DL (ref 6.4–8.2)
PROT UR STRIP-MCNC: NEGATIVE MG/DL
RBC # BLD AUTO: 5.28 M/UL (ref 4.1–5.7)
SODIUM SERPL-SCNC: 139 MMOL/L (ref 136–145)
SP GR UR REFRACTOMETRY: 1.01 (ref 1–1.03)
UROBILINOGEN UR QL STRIP.AUTO: 1 EU/DL (ref 0.2–1)
WBC # BLD AUTO: 5.4 K/UL (ref 4.1–11.1)

## 2021-08-04 PROCEDURE — 81003 URINALYSIS AUTO W/O SCOPE: CPT

## 2021-08-04 PROCEDURE — 96374 THER/PROPH/DIAG INJ IV PUSH: CPT

## 2021-08-04 PROCEDURE — 74011250636 HC RX REV CODE- 250/636: Performed by: EMERGENCY MEDICINE

## 2021-08-04 PROCEDURE — 74011000636 HC RX REV CODE- 636: Performed by: EMERGENCY MEDICINE

## 2021-08-04 PROCEDURE — 36415 COLL VENOUS BLD VENIPUNCTURE: CPT

## 2021-08-04 PROCEDURE — 74177 CT ABD & PELVIS W/CONTRAST: CPT

## 2021-08-04 PROCEDURE — 80053 COMPREHEN METABOLIC PANEL: CPT

## 2021-08-04 PROCEDURE — 99284 EMERGENCY DEPT VISIT MOD MDM: CPT

## 2021-08-04 PROCEDURE — 85025 COMPLETE CBC W/AUTO DIFF WBC: CPT

## 2021-08-04 PROCEDURE — 83690 ASSAY OF LIPASE: CPT

## 2021-08-04 RX ORDER — ONDANSETRON 2 MG/ML
4 INJECTION INTRAMUSCULAR; INTRAVENOUS
Status: COMPLETED | OUTPATIENT
Start: 2021-08-04 | End: 2021-08-04

## 2021-08-04 RX ORDER — DICYCLOMINE HYDROCHLORIDE 20 MG/1
20 TABLET ORAL
Qty: 28 TABLET | Refills: 0 | Status: SHIPPED | OUTPATIENT
Start: 2021-08-04 | End: 2022-01-06

## 2021-08-04 RX ADMIN — ONDANSETRON 4 MG: 2 INJECTION INTRAMUSCULAR; INTRAVENOUS at 20:02

## 2021-08-04 RX ADMIN — IOPAMIDOL 100 ML: 755 INJECTION, SOLUTION INTRAVENOUS at 20:48

## 2021-08-04 RX ADMIN — SODIUM CHLORIDE 1000 ML: 9 INJECTION, SOLUTION INTRAVENOUS at 20:03

## 2021-08-04 NOTE — ED PROVIDER NOTES
EMERGENCY DEPARTMENT HISTORY AND PHYSICAL EXAM      Date: 8/4/2021  Patient Name: Irving Aguilar    History of Presenting Illness     Chief Complaint   Patient presents with    Abdominal Pain     Pt arrives via EMS from home for c/o abd pain and nausea. Last BM yesterday. Pt states he is feeling very anxious. History Provided By: Patient    HPI: Irving Aguilar, 32 y.o. male presents to the ED with cc of abdominal pain. 51-year-old male with no significant past medical history presents emergency department with abdominal pain. Patient reports pain began 3 weeks ago. Initially intermittent. Pain is located as right upper quadrant and associated with a \"bulge\". Patient was seen at a urgent care x2 and discharged. Patient reports he has had constipation as well as loss of appetite. No nausea or vomiting. No fevers or chills. Today patient reports a severe episode of pain looking his right upper quadrant. Reports it is associate with his abdomen distending. He reports this lasted approximately 30 minutes and then he felt like something \"popped\" and the pain improved. The pain radiated into his right lower side. No hematuria or dysuria. Currently pain is improved. Denies history of intra-abdominal surgeries. There are no other complaints, changes, or physical findings at this time. PCP: None    No current facility-administered medications on file prior to encounter. Current Outpatient Medications on File Prior to Encounter   Medication Sig Dispense Refill    Cetirizine (ZyrTEC) 10 mg cap Take  by mouth daily as needed.  znogggill-AO-uwyyruxd-guaifen (Mucinex Cold,Flu,Sore Throat) 10- mg/20 mL liqd Take  by mouth four (4) times daily as needed.  acetaminophen (TylenoL) 325 mg tablet Take  by mouth every four (4) hours as needed for Pain.  fexofenadine HCl (ALLEGRA PO) Take 20 mg by mouth.          Past History     Past Medical History:  Past Medical History: Diagnosis Date    Epilepsy [facility])        Past Surgical History:  No past surgical history on file. Family History:  Family History   Problem Relation Age of Onset    Cancer Maternal Grandmother        Social History:  Social History     Tobacco Use    Smoking status: Never Smoker    Smokeless tobacco: Never Used   Substance Use Topics    Alcohol use: Not Currently    Drug use: Yes     Types: Marijuana       Allergies:  No Known Allergies      Review of Systems   Review of Systems   Constitutional: Negative for fever. HENT: Negative for voice change. Eyes: Negative for pain and redness. Respiratory: Negative for cough and chest tightness. Cardiovascular: Negative for chest pain and leg swelling. Gastrointestinal: Positive for abdominal distention, abdominal pain and constipation. Negative for diarrhea, nausea and vomiting. Genitourinary: Negative for discharge, dysuria, hematuria, penile swelling, scrotal swelling and testicular pain. Musculoskeletal: Negative for gait problem. Skin: Negative for color change, pallor and rash. Neurological: Negative for facial asymmetry, weakness and headaches. Hematological: Does not bruise/bleed easily. Psychiatric/Behavioral: Negative for behavioral problems. All other systems reviewed and are negative. Physical Exam   Physical Exam  Vitals and nursing note reviewed. Constitutional:       Comments: 27-year-old male, resting bed, no distress   HENT:      Head: Normocephalic. Right Ear: External ear normal.      Left Ear: External ear normal.      Nose: Nose normal.   Eyes:      Conjunctiva/sclera: Conjunctivae normal.   Cardiovascular:      Rate and Rhythm: Normal rate and regular rhythm. Heart sounds: No murmur heard. No friction rub. No gallop. Pulmonary:      Effort: Pulmonary effort is normal.      Breath sounds: Normal breath sounds. No wheezing, rhonchi or rales. Abdominal:      Palpations: Abdomen is soft. Tenderness: There is abdominal tenderness in the right lower quadrant. There is no guarding or rebound. Hernia: No hernia is present. Musculoskeletal:         General: Normal range of motion. Skin:     General: Skin is warm. Capillary Refill: Capillary refill takes less than 2 seconds. Neurological:      Mental Status: He is alert. Mental status is at baseline. Psychiatric:         Mood and Affect: Mood normal.         Behavior: Behavior normal.         Diagnostic Study Results     Labs -     Recent Results (from the past 12 hour(s))   CBC WITH AUTOMATED DIFF    Collection Time: 08/04/21  6:10 PM   Result Value Ref Range    WBC 5.4 4.1 - 11.1 K/uL    RBC 5.28 4.10 - 5.70 M/uL    HGB 14.9 12.1 - 17.0 g/dL    HCT 43.5 36.6 - 50.3 %    MCV 82.4 80.0 - 99.0 FL    MCH 28.2 26.0 - 34.0 PG    MCHC 34.3 30.0 - 36.5 g/dL    RDW 13.0 11.5 - 14.5 %    PLATELET 368 219 - 611 K/uL    MPV 9.2 8.9 - 12.9 FL    NRBC 0.0 0  WBC    ABSOLUTE NRBC 0.00 0.00 - 0.01 K/uL    NEUTROPHILS 61 32 - 75 %    LYMPHOCYTES 29 12 - 49 %    MONOCYTES 8 5 - 13 %    EOSINOPHILS 1 0 - 7 %    BASOPHILS 1 0 - 1 %    IMMATURE GRANULOCYTES 0 0.0 - 0.5 %    ABS. NEUTROPHILS 3.3 1.8 - 8.0 K/UL    ABS. LYMPHOCYTES 1.6 0.8 - 3.5 K/UL    ABS. MONOCYTES 0.4 0.0 - 1.0 K/UL    ABS. EOSINOPHILS 0.1 0.0 - 0.4 K/UL    ABS. BASOPHILS 0.0 0.0 - 0.1 K/UL    ABS. IMM.  GRANS. 0.0 0.00 - 0.04 K/UL    DF AUTOMATED     METABOLIC PANEL, COMPREHENSIVE    Collection Time: 08/04/21  6:10 PM   Result Value Ref Range    Sodium 139 136 - 145 mmol/L    Potassium 4.1 3.5 - 5.1 mmol/L    Chloride 106 97 - 108 mmol/L    CO2 29 21 - 32 mmol/L    Anion gap 4 (L) 5 - 15 mmol/L    Glucose 128 (H) 65 - 100 mg/dL    BUN 12 6 - 20 MG/DL    Creatinine 1.32 (H) 0.70 - 1.30 MG/DL    BUN/Creatinine ratio 9 (L) 12 - 20      GFR est AA >60 >60 ml/min/1.73m2    GFR est non-AA >60 >60 ml/min/1.73m2    Calcium 9.1 8.5 - 10.1 MG/DL    Bilirubin, total 0.7 0.2 - 1.0 MG/DL    ALT (SGPT) 34 12 - 78 U/L    AST (SGOT) 27 15 - 37 U/L    Alk. phosphatase 58 45 - 117 U/L    Protein, total 8.5 (H) 6.4 - 8.2 g/dL    Albumin 4.4 3.5 - 5.0 g/dL    Globulin 4.1 (H) 2.0 - 4.0 g/dL    A-G Ratio 1.1 1.1 - 2.2     LIPASE    Collection Time: 08/04/21  6:10 PM   Result Value Ref Range    Lipase 136 73 - 393 U/L   URINALYSIS W/ RFLX MICROSCOPIC    Collection Time: 08/04/21  9:12 PM   Result Value Ref Range    Color YELLOW/STRAW      Appearance CLEAR CLEAR      Specific gravity 1.010 1.003 - 1.030      pH (UA) 6.5 5.0 - 8.0      Protein Negative NEG mg/dL    Glucose Negative NEG mg/dL    Ketone Negative NEG mg/dL    Bilirubin Negative NEG      Blood Negative NEG      Urobilinogen 1.0 0.2 - 1.0 EU/dL    Nitrites Negative NEG      Leukocyte Esterase Negative NEG         Radiologic Studies -   CT ABD PELV W CONT   Final Result   Normal study. CT Results  (Last 48 hours)               08/04/21 2048  CT ABD PELV W CONT Final result    Impression:  Normal study. Narrative:  INDICATION: RLQ pain, r/o appy, constipation, anorexia        EXAM: CT Abdomen and Pelvis is performed with 100 mL Isovue 370 contrast IV   without oral contrast. CT dose reduction was achieved through use of a   standardized protocol tailored for this examination and automatic exposure   control for dose modulation. FINDINGS:    There is no inflammation, ascites, pneumoperitoneum or significant adenopathy. Liver shows no significant finding. Bile ducts are not enlarged. Pancreas shows   no mass or inflammation. Spleen is unremarkable. Adrenal glands are normal in   size. Kidneys show no mass or hydronephrosis. Aorta is without aneurysm. The appendix is normal. Bowels are not dilated. There is no significant stool. The bladder is unremarkable. The distal ureters are not dilated. There is no   apparent pelvic mass.                CXR Results  (Last 48 hours)    None          Medical Decision Making   I am the first provider for this patient. I reviewed the vital signs, available nursing notes, past medical history, past surgical history, family history and social history. Vital Signs-Reviewed the patient's vital signs. Patient Vitals for the past 12 hrs:   Temp Pulse Resp BP SpO2   08/04/21 2130 -- -- -- 138/79 97 %   08/04/21 2118 -- -- -- -- 97 %   08/04/21 2116 -- -- -- 133/80 --   08/04/21 1800 98.2 °F (36.8 °C) (!) 105 20 (!) 136/99 100 %     Records Reviewed: Nursing Notes and Old Medical Records    Provider Notes (Medical Decision Making):     30-year-old male presents emergency department with a chief complaint of abdominal pain. Vitals are stable. He is tender in his right lower quadrant. I have a low suspicion for intra-abdominal pathology, but will CT abdomen pelvis to rule out appendicitis, small bowel obstruction. Other consideration including nephrolithiasis that was passed. Labs are reassuring, if ED work-up is negative anticipate patient be discharged with PCP follow-up. ED Course:   Initial assessment performed. The patients presenting problems have been discussed, and they are in agreement with the care plan formulated and outlined with them. I have encouraged them to ask questions as they arise throughout their visit. ED Course as of Aug 04 2204   Wed Aug 04, 2021   2136 Labs are reassuring awaiting urine, CT negative. [MB]   2155 Urine negative. [MB]      ED Course User Index  [MB] Palma Terry MD     Updated patient advised of a bowel regimen. Bentyl as needed for pain. Return precautions. PCP referrals. Rosario Gonzalez MD      Disposition:    Discharged    DISCHARGE PLAN:  1. Current Discharge Medication List      START taking these medications    Details   dicyclomine (BENTYL) 20 mg tablet Take 1 Tablet by mouth every six to eight (6-8) hours as needed for Abdominal Cramps. Qty: 28 Tablet, Refills: 0  Start date: 8/4/2021           2.    Follow-up Information     Follow up With Specialties Details Why Contact Info    [facility] EMERGENCY DEPT Emergency Medicine  If symptoms worsen [address]  Taisha Barton [id number] [phone]        3. Return to ED if worse     Diagnosis     Clinical Impression:   1. Abdominal pain, right upper quadrant        Attestations:    Jerome Ramirez MD    Please note that this dictation was completed with [employer]ipelago, the computer voice recognition software. Quite often unanticipated grammatical, syntax, homophones, and other interpretive errors are inadvertently transcribed by the computer software. Please disregard these errors. Please excuse any errors that have escaped final proofreading. Thank you.

## 2021-08-04 NOTE — ED TRIAGE NOTES
Pt arrives via EMS from home for c/o abd pain and nausea. Last BM yesterday. Pt states he is feeling very anxious.

## 2021-08-04 NOTE — Clinical Note
[address]  [facility] EMERGENCY DEPT  [address] [place] [address]  Lazara Mcclain [id number] [phone]    Work/School Note    Date: 8/4/2021    To Whom It May concern:    Duane Crest was seen and treated today in the emergency room by the following provider(s):  Attending Provider: Palma Terry MD.      Duane Crest is excused from work/school on 08/04/21 and 08/05/21. He is medically clear to return to work/school on 8/6/2021.        Sincerely,          Rosario Gonzalez MD

## 2021-08-05 NOTE — DISCHARGE INSTRUCTIONS
Please use the medication bentyl for pain. Please follow-up with your primary care doctor. Please return for worsening symptoms at any time.     Local Primary Care Physicians  [facility] Physicians [phone]  MD Wanda Saha MD Lolly Currier, MD [facility] Doctors [phone]  Lev Peguero, [facility]  MD Rosario Padron, MD [facility] [phone]  MD Elvia Elena MD [address] [facility] [phone]  MD Philipp Carias, MD Cole Minor, MD Gustave Hashimoto, MD   [facility] [phone]  [facility] MD Roland PULLIAM, MD Berto Tovar, NP [address] [phone]  Cindy Baker, MD Annita Patrick, MD Steve Jang, MD Danna Mcintosh, MD Mello Queen MD   [address] [facility]  Cris Davalos MD [facility] [phone]  MD Rosario Archer, HONORIO Rasheed, MD Mike Huang, MD Davidson Nance, MD Joel Reno, MD Jeneane Severance, MD   [address] [facility] [phone]  Shree Shah, MD Melissa Brar, P  Sharon Bledsoe, NP  MD Sasha Lopez, MD Sheryle Boys, MD Claybon Crutch, MD [facility] [phone]  Chris Cai, MD Luz Isabel, MD Pia Celestin, MD Cecily Somers, MD Neftali Farooq MD   [address] [phone]  Gerardine Rasp, MD Lamona Opitz, MD [place] [phone]  MD Yamileth Merritt MD Mevelyn Boatman, MD   [facility] Physicians [phone]  MD Genny Patterson, MD Jose Alejandro Pacheco, MD Saman Guido, MD Aster Liang, MD Jarad Escobedo, NP  Octavia Lopez MD [address]  66   [phone]  Uli Sam, MD Stevie Mackenzie, MD Mart Patino, MD   [address] [facility] [phone]  [address] [place] [address] MD Joi Cyr, VIVIANA Plaza, VIVIANA Mcginnis MD Charla Emms, NP Percell Beck, DO             Miscellaneous:  Tila Vargas MD [facility] Departments     For adult and child immunizations, family planning, TB screening, STD testing and women's health services. [facility]: [place] [phone]      [facility] 25   [address]   [address]   170 [facility]: [facility] [address] [phone]      [address] [facility]          [address] [place] [address] [address]     For primary care services, woman and child wellness, and some clinics providing specialty care. [facility] -- [address] [facility]5 [facility] [phone]   [address] [facility] 200 [facility] 3614 [facility] [phone]   [address] [facility] [address] [place] [address] [phone]   [address] [place] [address] E[address] [place] [address] 5850  Community  [phone]   [address] [place] [address] [phone]   [facility] 81 [facility] [phone]   [facility] 1051 [facility] [phone]   [facility]: [facility] [address] [facility] [address] [phone]     [place] [address] [phone]   [facility] [address]  [phone]   [employer]  [address] ([email or web address]) [phone]         Sexual Health/Woman Wellness Clinics    For STD/HIV testing and treatment, pregnancy testing and services, men's health, birth control services, LGBT services, and hepatitis/HPV vaccine services. [facility] [employer] for [place] [employer] [address] [facility] [address] [facility] [address]  [facility] Esteban Nielsen [phone] [address] Meghann Aparicio [phone]   [facility] [address], 5th floor [phone]   Pregnancy 3928 [facility] [address] for Women [address]  Jesusita Hummeld [phone]         [place] [address] [facility] [address] [place] [address]   [phone]   [place]   [phone]   Women, Infant and Children's Services: [place] [address] [phone]       [address] [place] [address]   [phone]   [address]   [facility] Psychiatry     [phone]   [address] Crisis   1212 [facility] [phone]

## 2021-11-13 ENCOUNTER — HOSPITAL ENCOUNTER (EMERGENCY)
Age: 26
Discharge: HOME | End: 2021-11-13
Attending: EMERGENCY MEDICINE
Payer: COMMERCIAL

## 2021-11-13 VITALS
RESPIRATION RATE: 16 BRPM | TEMPERATURE: 97.8 F | SYSTOLIC BLOOD PRESSURE: 129 MMHG | HEART RATE: 71 BPM | OXYGEN SATURATION: 100 % | DIASTOLIC BLOOD PRESSURE: 74 MMHG | HEIGHT: 68 IN | BODY MASS INDEX: 28.97 KG/M2 | WEIGHT: 191.14 LBS

## 2021-11-13 DIAGNOSIS — R10.84 ABDOMINAL PAIN, GENERALIZED: Primary | ICD-10-CM

## 2021-11-13 LAB
ALBUMIN SERPL-MCNC: 4.1 G/DL (ref 3.5–5)
ALBUMIN/GLOB SERPL: 1.1 {RATIO} (ref 1.1–2.2)
ALP SERPL-CCNC: 66 U/L (ref 45–117)
ALT SERPL-CCNC: 25 U/L (ref 12–78)
ANION GAP SERPL CALC-SCNC: 5 MMOL/L (ref 5–15)
APPEARANCE UR: CLEAR
AST SERPL-CCNC: 18 U/L (ref 15–37)
BACTERIA URNS QL MICRO: NEGATIVE /HPF
BASOPHILS # BLD: 0 K/UL (ref 0–0.1)
BASOPHILS NFR BLD: 1 % (ref 0–1)
BILIRUB SERPL-MCNC: 0.6 MG/DL (ref 0.2–1)
BILIRUB UR QL: NEGATIVE
BUN SERPL-MCNC: 13 MG/DL (ref 6–20)
BUN/CREAT SERPL: 13 (ref 12–20)
CALCIUM SERPL-MCNC: 9.4 MG/DL (ref 8.5–10.1)
CHLORIDE SERPL-SCNC: 108 MMOL/L (ref 97–108)
CO2 SERPL-SCNC: 25 MMOL/L (ref 21–32)
COLOR UR: NORMAL
CREAT SERPL-MCNC: 1 MG/DL (ref 0.7–1.3)
DIFFERENTIAL METHOD BLD: NORMAL
EOSINOPHIL # BLD: 0.1 K/UL (ref 0–0.4)
EOSINOPHIL NFR BLD: 1 % (ref 0–7)
EPITH CASTS URNS QL MICRO: NORMAL /LPF
ERYTHROCYTE [DISTWIDTH] IN BLOOD BY AUTOMATED COUNT: 12.8 % (ref 11.5–14.5)
GLOBULIN SER CALC-MCNC: 3.8 G/DL (ref 2–4)
GLUCOSE SERPL-MCNC: 84 MG/DL (ref 65–100)
GLUCOSE UR STRIP.AUTO-MCNC: NEGATIVE MG/DL
HCT VFR BLD AUTO: 42.5 % (ref 36.6–50.3)
HGB BLD-MCNC: 13.9 G/DL (ref 12.1–17)
HGB UR QL STRIP: NEGATIVE
HYALINE CASTS URNS QL MICRO: NORMAL /LPF (ref 0–5)
IMM GRANULOCYTES # BLD AUTO: 0 K/UL (ref 0–0.04)
IMM GRANULOCYTES NFR BLD AUTO: 0 % (ref 0–0.5)
KETONES UR QL STRIP.AUTO: NEGATIVE MG/DL
LEUKOCYTE ESTERASE UR QL STRIP.AUTO: NEGATIVE
LYMPHOCYTES # BLD: 1.3 K/UL (ref 0.8–3.5)
LYMPHOCYTES NFR BLD: 26 % (ref 12–49)
MCH RBC QN AUTO: 27.4 PG (ref 26–34)
MCHC RBC AUTO-ENTMCNC: 32.7 G/DL (ref 30–36.5)
MCV RBC AUTO: 83.7 FL (ref 80–99)
MONOCYTES # BLD: 0.5 K/UL (ref 0–1)
MONOCYTES NFR BLD: 11 % (ref 5–13)
NEUTS SEG # BLD: 3 K/UL (ref 1.8–8)
NEUTS SEG NFR BLD: 61 % (ref 32–75)
NITRITE UR QL STRIP.AUTO: NEGATIVE
NRBC # BLD: 0 K/UL (ref 0–0.01)
NRBC BLD-RTO: 0 PER 100 WBC
PH UR STRIP: 5.5 [PH] (ref 5–8)
PLATELET # BLD AUTO: 326 K/UL (ref 150–400)
PMV BLD AUTO: 9 FL (ref 8.9–12.9)
POTASSIUM SERPL-SCNC: 3.7 MMOL/L (ref 3.5–5.1)
PROT SERPL-MCNC: 7.9 G/DL (ref 6.4–8.2)
PROT UR STRIP-MCNC: NEGATIVE MG/DL
RBC # BLD AUTO: 5.08 M/UL (ref 4.1–5.7)
RBC #/AREA URNS HPF: NORMAL /HPF (ref 0–5)
SODIUM SERPL-SCNC: 138 MMOL/L (ref 136–145)
SP GR UR REFRACTOMETRY: 1.03 (ref 1–1.03)
UA: UC IF INDICATED,UAUC: NORMAL
UROBILINOGEN UR QL STRIP.AUTO: 1 EU/DL (ref 0.2–1)
WBC # BLD AUTO: 4.9 K/UL (ref 4.1–11.1)
WBC URNS QL MICRO: NORMAL /HPF (ref 0–4)

## 2021-11-13 PROCEDURE — 85025 COMPLETE CBC W/AUTO DIFF WBC: CPT

## 2021-11-13 PROCEDURE — 80053 COMPREHEN METABOLIC PANEL: CPT

## 2021-11-13 PROCEDURE — 81001 URINALYSIS AUTO W/SCOPE: CPT

## 2021-11-13 PROCEDURE — 99284 EMERGENCY DEPT VISIT MOD MDM: CPT

## 2021-11-13 PROCEDURE — 36415 COLL VENOUS BLD VENIPUNCTURE: CPT

## 2021-11-13 RX ORDER — IBUPROFEN 600 MG/1
600 TABLET ORAL
Qty: 20 TABLET | Refills: 0 | Status: SHIPPED | OUTPATIENT
Start: 2021-11-13 | End: 2022-01-05 | Stop reason: SDUPTHER

## 2021-11-13 NOTE — Clinical Note
[address]  [facility] EMERGENCY DEPT  [address] [facility]  Azul Stephens [id number]  [phone]    Work/School Note    Date: 11/13/2021    To Whom It May concern:      Indiana Walsh was seen and treated today in the emergency room by the following provider(s):  Attending Provider: Rene Walker DO. Indiana Walsh is excused from work/school on 11/13/21. He is clear to return to work/school on 11/14/21.         Sincerely,          Víctor Del Angel DO

## 2021-11-13 NOTE — ED PROVIDER NOTES
EMERGENCY DEPARTMENT HISTORY AND PHYSICAL EXAM      Date: 11/13/2021  Patient Name: Madi Pina    Please note that this dictation was completed with VivoText, the computer voice recognition software. Quite often unanticipated grammatical, syntax, homophones, and other interpretive errors are inadvertently transcribed by the computer software. Please disregard these errors. Please excuse any errors that have escaped final proofreading. History of Presenting Illness     Chief Complaint   Patient presents with    Abdominal Pain     arrive by rescue having an episode today about an hour ago for abdominal pressure; he had just finished trying to have a bm; rescue also mentioned he had lifted something heavy 4 to 5 days ago and wanted to make sure he doesn't have a hernia. no vomiting. History Provided By: Patient     HPI: Madi Pina, 32 y.o. male, seen in the emergency department complaining of abdominal discomfort. Patient states that abdominal discomfort started this morning after he tried to have a bowel movement could not pass stool. He then started getting cramping pain throughout his abdomen. He is also concerned that he may have a right inguinal hernia as he has been having pain in the right groin for several days. Its worse with lifting, straining. He was told that he had a groin strain by urgent care, but he states it has not gotten better and feels like it is getting worse. No swelling or lumps in the groin at this time. Patient denies any vomiting. No other exacerbating relieving factors or associated symptoms    PCP: None    No current facility-administered medications on file prior to encounter. Current Outpatient Medications on File Prior to Encounter   Medication Sig Dispense Refill    dicyclomine (BENTYL) 20 mg tablet Take 1 Tablet by mouth every six to eight (6-8) hours as needed for Abdominal Cramps.  28 Tablet 0    Cetirizine (ZyrTEC) 10 mg cap Take  by mouth daily as needed.  acijcmurp-MY-ophibcbd-guaifen (Mucinex Cold,Flu,Sore Throat) 10- mg/20 mL liqd Take  by mouth four (4) times daily as needed.  acetaminophen (TylenoL) 325 mg tablet Take  by mouth every four (4) hours as needed for Pain.  fexofenadine HCl (ALLEGRA PO) Take 20 mg by mouth. Past History     Past Medical History:  Past Medical History:   Diagnosis Date    Epilepsy [facility])        Past Surgical History:  No past surgical history on file. Family History:  Family History   Problem Relation Age of Onset    Cancer Maternal Grandmother        Social History:  Social History     Tobacco Use    Smoking status: Never Smoker    Smokeless tobacco: Never Used   Substance Use Topics    Alcohol use: Not Currently    Drug use: Yes     Types: Marijuana       Allergies:  No Known Allergies      Review of Systems   Review of Systems   Constitutional: Negative for chills and fever. HENT: Negative for congestion and sore throat. Eyes: Negative for visual disturbance. Respiratory: Negative for cough and shortness of breath. Cardiovascular: Negative for chest pain and leg swelling. Gastrointestinal: Positive for abdominal pain. Negative for blood in stool, diarrhea, nausea and vomiting. Endocrine: Negative for polyuria. Genitourinary: Negative for dysuria and testicular pain. Musculoskeletal: Negative for arthralgias, joint swelling and myalgias. Skin: Negative for rash. Allergic/Immunologic: Negative for immunocompromised state. Neurological: Negative for weakness and headaches. Hematological: Does not bruise/bleed easily. Psychiatric/Behavioral: Negative for confusion. Physical Exam   Physical Exam  Vitals and nursing note reviewed. Constitutional:       Appearance: He is well-developed. HENT:      Head: Normocephalic and atraumatic. Eyes:      General:         Right eye: No discharge. Left eye: No discharge.       Conjunctiva/sclera: Conjunctivae normal.      Pupils: Pupils are equal, round, and reactive to light. Neck:      Trachea: No tracheal deviation. Cardiovascular:      Rate and Rhythm: Normal rate and regular rhythm. Heart sounds: Normal heart sounds. No murmur heard. Pulmonary:      Effort: Pulmonary effort is normal. No respiratory distress. Breath sounds: Normal breath sounds. No wheezing or rales. Abdominal:      General: Bowel sounds are normal.      Palpations: Abdomen is soft. Tenderness: There is no abdominal tenderness. There is no guarding or rebound. Comments: Patient has no palpable hernia without Valsalva, no in the right inguinal region with Valsalva he may have a small inguinal hernia. Easily reduces after Valsalva   Musculoskeletal:         General: No tenderness or deformity. Normal range of motion. Cervical back: Normal range of motion and neck supple. Skin:     General: Skin is warm and dry. Findings: No erythema or rash. Neurological:      Mental Status: He is alert and oriented to person, place, and time. Psychiatric:         Behavior: Behavior normal.         Diagnostic Study Results     Labs -     No results found for this or any previous visit (from the past 12 hour(s)). Radiologic Studies -   No orders to display     CT Results  (Last 48 hours)    None        CXR Results  (Last 48 hours)    None            Medical Decision Making   I am the first provider for this patient. I reviewed the vital signs, available nursing notes, past medical history, past surgical history, family history and social history. Vital Signs-Reviewed the patient's vital signs. No data found. Records Reviewed:   Nursing notes, Prior visits     Provider Notes (Medical Decision Making):   Patient's labs are reassuring, abdominal exam is reassuring. Likely just bowel spasm after straining. No concern for appendicitis, no acute abdomen on exam.  May have a small reducible inguinal hernia. Will have patient follow-up with general surgery for further evaluation. ED Course:   Initial assessment performed. The patients presenting problems have been discussed, and they are in agreement with the care plan formulated and outlined with them. I have encouraged them to ask questions as they arise throughout their visit. Critical Care Time:   none    Disposition:    DISCHARGE NOTE  Patients results have been reviewed with them. Patient and/or family have verbally conveyed their understanding and agreement of the patient's signs, symptoms, diagnosis, treatment and prognosis and additionally agree to follow up as recommended or return to the Emergency Room should their condition change or have any new concerns prior to their follow-up appointment. Patient verbally agrees with the care-plan and verbally conveys that all of their questions have been answered. Discharge instructions have also been provided to the patient with some educational information regarding their diagnosis as well a list of reasons why they would want to return to the ER prior to their follow-up appointment should their condition change. PLAN:  1. Discharge Medication List as of 11/13/2021  5:10 PM      START taking these medications    Details   ibuprofen (MOTRIN) 600 mg tablet Take 1 Tablet by mouth every six (6) hours as needed for Pain., Normal, Disp-20 Tablet, R-0         CONTINUE these medications which have NOT CHANGED    Details   dicyclomine (BENTYL) 20 mg tablet Take 1 Tablet by mouth every six to eight (6-8) hours as needed for Abdominal Cramps., Print, Disp-28 Tablet, R-0      Cetirizine (ZyrTEC) 10 mg cap Take  by mouth daily as needed., Historical Med      utdcawkon-NK-wczezzyj-guaifen (Mucinex Cold,Flu,Sore Throat) 10- mg/20 mL liqd Take  by mouth four (4) times daily as needed., Historical Med      acetaminophen (TylenoL) 325 mg tablet Take  by mouth every four (4) hours as needed for Pain. , Historical Med      fexofenadine HCl (ALLEGRA PO) Take 20 mg by mouth., Historical Med           2. Follow-up Information     Follow up With Specialties Details Why Contact Info    Martín Hudson MD General Surgery  possible right inguinal hernia [address] [facility] [address]  [phone]      [facility] EMERGENCY DEPT Emergency Medicine  If symptoms worsen [address] [facility]  [address] [address] [facility]  [phone]          Return to ED if worse     Diagnosis     Clinical Impression:   1. Abdominal pain, generalized        Attestations:   This note was completed by Vesta Rivera DO

## 2021-11-13 NOTE — ED NOTES
I have reviewed written and verbal discharge instructions with the patient. The patient verbalized understanding. I.V. removed and pt ambulated out of ER without difficulty.

## 2021-11-22 ENCOUNTER — HOSPITAL ENCOUNTER (EMERGENCY)
Age: 26
Discharge: HOME | End: 2021-11-22
Attending: EMERGENCY MEDICINE
Payer: COMMERCIAL

## 2021-11-22 VITALS
SYSTOLIC BLOOD PRESSURE: 134 MMHG | OXYGEN SATURATION: 100 % | RESPIRATION RATE: 18 BRPM | DIASTOLIC BLOOD PRESSURE: 79 MMHG | BODY MASS INDEX: 29.27 KG/M2 | HEIGHT: 68 IN | HEART RATE: 74 BPM | TEMPERATURE: 98.2 F | WEIGHT: 193.12 LBS

## 2021-11-22 DIAGNOSIS — R10.31 RIGHT GROIN PAIN: Primary | ICD-10-CM

## 2021-11-22 LAB
AMORPH CRY URNS QL MICRO: ABNORMAL
APPEARANCE UR: CLEAR
BACTERIA URNS QL MICRO: NEGATIVE /HPF
BILIRUB UR QL: NEGATIVE
COLOR UR: ABNORMAL
EPITH CASTS URNS QL MICRO: ABNORMAL /LPF
GLUCOSE UR STRIP.AUTO-MCNC: NEGATIVE MG/DL
HGB UR QL STRIP: NEGATIVE
KETONES UR QL STRIP.AUTO: NEGATIVE MG/DL
LEUKOCYTE ESTERASE UR QL STRIP.AUTO: NEGATIVE
NITRITE UR QL STRIP.AUTO: NEGATIVE
PH UR STRIP: 6 [PH] (ref 5–8)
PROT UR STRIP-MCNC: NEGATIVE MG/DL
RBC #/AREA URNS HPF: ABNORMAL /HPF (ref 0–5)
SP GR UR REFRACTOMETRY: 1.01 (ref 1–1.03)
UA: UC IF INDICATED,UAUC: ABNORMAL
UROBILINOGEN UR QL STRIP.AUTO: 0.2 EU/DL (ref 0.2–1)
WBC URNS QL MICRO: ABNORMAL /HPF (ref 0–4)

## 2021-11-22 PROCEDURE — 99284 EMERGENCY DEPT VISIT MOD MDM: CPT

## 2021-11-22 PROCEDURE — 81001 URINALYSIS AUTO W/SCOPE: CPT

## 2021-11-22 RX ORDER — POLYETHYLENE GLYCOL 3350 17 G/17G
17 POWDER, FOR SOLUTION ORAL DAILY
Qty: 116 G | Refills: 0 | Status: SHIPPED | OUTPATIENT
Start: 2021-11-22 | End: 2022-01-06

## 2021-11-22 NOTE — ED NOTES
Pt discharged by PA. Pt provided with discharge instructions Rx and instructions on follow up care. Pt out of ED ambulatory.

## 2021-11-22 NOTE — ED PROVIDER NOTES
EMERGENCY DEPARTMENT HISTORY AND PHYSICAL EXAM      Date: 11/22/2021  Patient Name: Delaney Escobar    History of Presenting Illness     Chief Complaint   Patient presents with    Groin Pain     Reports soreness in groin starting this am, has inguinal hernia. Denied dysuria. History Provided By: Patient    HPI: Delaney Escobar, 32 y.o. male with a history of epilepsy presents ambulatory to the ED with cc of several hours of 6 out of 10 constant, achy right-sided groin pain. He tells me he has had this pain for some time and was evaluated here recently for possible hernia. He tells me earlier this morning he was masturbating when he felt a sudden, sharp pain in his right groin. He denies any urinary symptoms such as urgency, dysuria or urinary frequency. He denies any urethral discharge. He denies any unusual genitourinary lumps, bumps or sores. He has been well without fever. There has been no nausea, vomiting or diarrhea. Denies abdominal pain. He tells me his right groin pain started at the beginning of this month when he was working at [employer] and lifted a heavy item. He tells me since then he has not been able to work because of his groin pain and has been seen for this right groin pain several times since. He has been referred to general surgery, however he has not scheduled appointment. There are no other complaints, changes, or physical findings at this time. PCP: None    Current Outpatient Medications   Medication Sig Dispense Refill    polyethylene glycol (Miralax) 17 gram/dose powder Take 17 g by mouth daily. 1 tablespoon with 8 oz of water daily for occasional constipation 116 g 0    ibuprofen (MOTRIN) 600 mg tablet Take 1 Tablet by mouth every six (6) hours as needed for Pain. 20 Tablet 0    dicyclomine (BENTYL) 20 mg tablet Take 1 Tablet by mouth every six to eight (6-8) hours as needed for Abdominal Cramps.  28 Tablet 0    Cetirizine (ZyrTEC) 10 mg cap Take  by mouth daily as needed.  lxixagdxp-TD-qssjnjgg-guaifen (Mucinex Cold,Flu,Sore Throat) 10- mg/20 mL liqd Take  by mouth four (4) times daily as needed.  acetaminophen (TylenoL) 325 mg tablet Take  by mouth every four (4) hours as needed for Pain.  fexofenadine HCl (ALLEGRA PO) Take 20 mg by mouth. Past History     Past Medical History:  Past Medical History:   Diagnosis Date    Epilepsy [facility])        Past Surgical History:  No past surgical history on file. Family History:  Family History   Problem Relation Age of Onset    Cancer Maternal Grandmother        Social History:  Social History     Tobacco Use    Smoking status: Never Smoker    Smokeless tobacco: Never Used   Substance Use Topics    Alcohol use: Not Currently    Drug use: Yes     Types: Marijuana       Allergies:  No Known Allergies  Review of Systems   Review of Systems   Constitutional: Negative for fatigue and fever. HENT: Negative for congestion, ear pain and rhinorrhea. Eyes: Negative for pain and redness. Respiratory: Negative for cough and wheezing. Cardiovascular: Negative for chest pain and palpitations. Gastrointestinal: Negative for abdominal pain, nausea and vomiting. Genitourinary: Negative for dysuria, frequency and urgency. Musculoskeletal: Negative for back pain, neck pain and neck stiffness. Right-sided groin pain   Skin: Negative for rash and wound. Neurological: Negative for weakness, light-headedness, numbness and headaches. Physical Exam   Physical Exam  Vitals and nursing note reviewed. Constitutional:       General: He is not in acute distress. Appearance: He is well-developed. He is not toxic-appearing. HENT:      Head: Normocephalic and atraumatic. No right periorbital erythema or left periorbital erythema. Jaw: No trismus. Right Ear: External ear normal.      Left Ear: External ear normal.      Nose: Nose normal.      Mouth/Throat:      Pharynx: Uvula midline. Eyes:      General: No scleral icterus. Conjunctiva/sclera: Conjunctivae normal.      Pupils: Pupils are equal, round, and reactive to light. Cardiovascular:      Rate and Rhythm: Normal rate and regular rhythm. Heart sounds: Normal heart sounds. Pulmonary:      Effort: Pulmonary effort is normal. No tachypnea, accessory muscle usage or respiratory distress. Breath sounds: Normal breath sounds. No decreased breath sounds or wheezing. Abdominal:      Palpations: Abdomen is soft. Abdomen is not rigid. Tenderness: There is no abdominal tenderness. There is no guarding. Hernia: There is no hernia in the right inguinal area. Genitourinary:         Comments:   Uncircumcised  Bilateral descended testes  There is no bulge of the groin or scrotum. I invaginate the scrotum with my right index finger navigating my way to the inguinal canal.  I asked the patient to perform Valsalva and to cough. This does reproduce the pain, however I do not appreciate an inguinal hernia. Musculoskeletal:         General: Normal range of motion. Cervical back: Full passive range of motion without pain and normal range of motion. Skin:     Findings: No rash. Neurological:      Mental Status: He is alert and oriented to person, place, and time. He is not disoriented. GCS: GCS eye subscore is 4. GCS verbal subscore is 5. GCS motor subscore is 6. Cranial Nerves: No cranial nerve deficit. Sensory: No sensory deficit.    Psychiatric:         Speech: Speech normal.       Diagnostic Study Results     Labs -     Recent Results (from the past 12 hour(s))   URINALYSIS W/ REFLEX CULTURE    Collection Time: 11/22/21 12:29 PM    Specimen: Urine   Result Value Ref Range    Color YELLOW/STRAW      Appearance CLEAR CLEAR      Specific gravity 1.014 1.003 - 1.030      pH (UA) 6.0 5.0 - 8.0      Protein Negative NEG mg/dL    Glucose Negative NEG mg/dL    Ketone Negative NEG mg/dL    Bilirubin Negative NEG      Blood Negative NEG      Urobilinogen 0.2 0.2 - 1.0 EU/dL    Nitrites Negative NEG      Leukocyte Esterase Negative NEG      WBC 0-4 0 - 4 /hpf    RBC 0-5 0 - 5 /hpf    Epithelial cells FEW FEW /lpf    Bacteria Negative NEG /hpf    UA:UC IF INDICATED CULTURE NOT INDICATED BY UA RESULT CNI      Amorphous Crystals FEW (A) NEG         Radiologic Studies -   No orders to display     CT Results  (Last 48 hours)    None        CXR Results  (Last 48 hours)    None        Medical Decision Making   I am the first provider for this patient. I reviewed the vital signs, available nursing notes, past medical history, past surgical history, family history and social history. Vital Signs-Reviewed the patient's vital signs. Patient Vitals for the past 12 hrs:   Temp Pulse Resp BP SpO2   11/22/21 1222 98.2 °F (36.8 °C) 74 18 134/79 100 %       Pulse Oximetry Analysis - 100% on RA    Records Reviewed: Nursing Notes, Old Medical Records, Previous Radiology Studies and Previous Laboratory Studies    Provider Notes (Medical Decision Making):   DDx: Groin strain, inguinal hernia, UTI, others    UA is clean and does not suggest UTI  I do not identify an inguinal hernia on the right. Patient had a very similar presentation in August of this year and a CT of the abdomen and pelvis at that time demonstrated no abnormality. Patient was examined in this facility on November 13, 2021 by an emergency physician who states patient may have a small, reducible right-sided inguinal hernia on exam.  Believe reasonable to defer additional testing and refer to surgery for evaluation if symptoms persist.    ED Course:   Initial assessment performed. The patients presenting problems have been discussed, and they are in agreement with the care plan formulated and outlined with them. I have encouraged them to ask questions as they arise throughout their visit. Disposition:  Discharge    PLAN:  1.    Discharge Medication List as of 11/22/2021  1:32 PM      START taking these medications    Details   polyethylene glycol (Miralax) 17 gram/dose powder Take 17 g by mouth daily. 1 tablespoon with 8 oz of water daily for occasional constipation, Normal, Disp-116 g, R-0         CONTINUE these medications which have NOT CHANGED    Details   ibuprofen (MOTRIN) 600 mg tablet Take 1 Tablet by mouth every six (6) hours as needed for Pain., Normal, Disp-20 Tablet, R-0      dicyclomine (BENTYL) 20 mg tablet Take 1 Tablet by mouth every six to eight (6-8) hours as needed for Abdominal Cramps., Print, Disp-28 Tablet, R-0      Cetirizine (ZyrTEC) 10 mg cap Take  by mouth daily as needed., Historical Med      npmsafcfo-TY-pkvezsph-guaifen (Mucinex Cold,Flu,Sore Throat) 10- mg/20 mL liqd Take  by mouth four (4) times daily as needed., Historical Med      acetaminophen (TylenoL) 325 mg tablet Take  by mouth every four (4) hours as needed for Pain., Historical Med      fexofenadine HCl (ALLEGRA PO) Take 20 mg by mouth., Historical Med           2. Follow-up Information     Follow up With Specialties Details Why Contact Info    Ciaran Matthew MD General Surgery Call  GENERAL SURGERY: as needed if groin pain persists [address] [facility] [address]  [phone]          Return to ED if worse     Diagnosis     Clinical Impression:   1.  Right groin pain

## 2021-11-22 NOTE — LETTER
[address]  [facility] EMERGENCY DEPT  [address] [id number] [phone]    Work/School Note    Date: 11/22/2021    To Whom It May concern:    Venita Weiss was seen and treated today in the emergency room by the following provider(s):  Attending Provider: Nicole Barr MD  Physician Assistant: FRANCIS Hickman. Venita Weiss may return to work on [id number].     Sincerely,          Caity Harding PA

## 2022-01-05 ENCOUNTER — APPOINTMENT (OUTPATIENT)
Dept: ULTRASOUND IMAGING | Age: 27
End: 2022-01-05
Attending: EMERGENCY MEDICINE
Payer: MEDICAID

## 2022-01-05 ENCOUNTER — TELEPHONE (OUTPATIENT)
Dept: SURGERY | Age: 27
End: 2022-01-05

## 2022-01-05 ENCOUNTER — HOSPITAL ENCOUNTER (EMERGENCY)
Age: 27
Discharge: HOME | End: 2022-01-05
Attending: EMERGENCY MEDICINE | Admitting: EMERGENCY MEDICINE
Payer: MEDICAID

## 2022-01-05 ENCOUNTER — APPOINTMENT (OUTPATIENT)
Dept: CT IMAGING | Age: 27
End: 2022-01-05
Attending: EMERGENCY MEDICINE
Payer: MEDICAID

## 2022-01-05 VITALS
WEIGHT: 204.15 LBS | HEIGHT: 69 IN | DIASTOLIC BLOOD PRESSURE: 82 MMHG | OXYGEN SATURATION: 100 % | BODY MASS INDEX: 30.24 KG/M2 | HEART RATE: 90 BPM | TEMPERATURE: 97.9 F | RESPIRATION RATE: 18 BRPM | SYSTOLIC BLOOD PRESSURE: 138 MMHG

## 2022-01-05 DIAGNOSIS — K40.90 RIGHT INGUINAL HERNIA: Primary | ICD-10-CM

## 2022-01-05 LAB
ANION GAP SERPL CALC-SCNC: 5 MMOL/L (ref 5–15)
APPEARANCE UR: CLEAR
BACTERIA URNS QL MICRO: NEGATIVE /HPF
BILIRUB UR QL: NEGATIVE
BUN SERPL-MCNC: 10 MG/DL (ref 6–20)
BUN/CREAT SERPL: 9 (ref 12–20)
CALCIUM SERPL-MCNC: 9.7 MG/DL (ref 8.5–10.1)
CHLORIDE SERPL-SCNC: 104 MMOL/L (ref 97–108)
CO2 SERPL-SCNC: 30 MMOL/L (ref 21–32)
COLOR UR: NORMAL
CREAT SERPL-MCNC: 1.08 MG/DL (ref 0.7–1.3)
EPITH CASTS URNS QL MICRO: NORMAL /LPF
ERYTHROCYTE [DISTWIDTH] IN BLOOD BY AUTOMATED COUNT: 12.9 % (ref 11.5–14.5)
GLUCOSE SERPL-MCNC: 87 MG/DL (ref 65–100)
GLUCOSE UR STRIP.AUTO-MCNC: NEGATIVE MG/DL
HCT VFR BLD AUTO: 42.3 % (ref 36.6–50.3)
HGB BLD-MCNC: 14.5 G/DL (ref 12.1–17)
HGB UR QL STRIP: NEGATIVE
HYALINE CASTS URNS QL MICRO: NORMAL /LPF (ref 0–5)
KETONES UR QL STRIP.AUTO: NEGATIVE MG/DL
LACTATE SERPL-SCNC: 1.4 MMOL/L (ref 0.4–2)
LEUKOCYTE ESTERASE UR QL STRIP.AUTO: NEGATIVE
MCH RBC QN AUTO: 27.6 PG (ref 26–34)
MCHC RBC AUTO-ENTMCNC: 34.3 G/DL (ref 30–36.5)
MCV RBC AUTO: 80.6 FL (ref 80–99)
NITRITE UR QL STRIP.AUTO: NEGATIVE
NRBC # BLD: 0 K/UL (ref 0–0.01)
NRBC BLD-RTO: 0 PER 100 WBC
PH UR STRIP: 7 [PH] (ref 5–8)
PLATELET # BLD AUTO: 250 K/UL (ref 150–400)
PMV BLD AUTO: 8.4 FL (ref 8.9–12.9)
POTASSIUM SERPL-SCNC: 4.4 MMOL/L (ref 3.5–5.1)
PROT UR STRIP-MCNC: NEGATIVE MG/DL
RBC # BLD AUTO: 5.25 M/UL (ref 4.1–5.7)
RBC #/AREA URNS HPF: NORMAL /HPF (ref 0–5)
SODIUM SERPL-SCNC: 139 MMOL/L (ref 136–145)
SP GR UR REFRACTOMETRY: 1.02 (ref 1–1.03)
UA: UC IF INDICATED,UAUC: NORMAL
UROBILINOGEN UR QL STRIP.AUTO: 0.2 EU/DL (ref 0.2–1)
WBC # BLD AUTO: 5.7 K/UL (ref 4.1–11.1)
WBC URNS QL MICRO: NORMAL /HPF (ref 0–4)

## 2022-01-05 PROCEDURE — 85027 COMPLETE CBC AUTOMATED: CPT

## 2022-01-05 PROCEDURE — 83605 ASSAY OF LACTIC ACID: CPT

## 2022-01-05 PROCEDURE — 76870 US EXAM SCROTUM: CPT

## 2022-01-05 PROCEDURE — 36415 COLL VENOUS BLD VENIPUNCTURE: CPT

## 2022-01-05 PROCEDURE — 74177 CT ABD & PELVIS W/CONTRAST: CPT

## 2022-01-05 PROCEDURE — 99284 EMERGENCY DEPT VISIT MOD MDM: CPT

## 2022-01-05 PROCEDURE — 80048 BASIC METABOLIC PNL TOTAL CA: CPT

## 2022-01-05 PROCEDURE — 81001 URINALYSIS AUTO W/SCOPE: CPT

## 2022-01-05 PROCEDURE — 74011000636 HC RX REV CODE- 636: Performed by: EMERGENCY MEDICINE

## 2022-01-05 RX ORDER — IBUPROFEN 800 MG/1
800 TABLET ORAL
Qty: 30 TABLET | Refills: 0 | Status: SHIPPED | OUTPATIENT
Start: 2022-01-05 | End: 2022-01-06

## 2022-01-05 RX ADMIN — IOPAMIDOL 100 ML: 755 INJECTION, SOLUTION INTRAVENOUS at 12:08

## 2022-01-05 NOTE — DISCHARGE INSTRUCTIONS
It was a pleasure taking care of you in our Emergency Department today. We know that when you come to [facility], you are entrusting us with your health, comfort, and safety. Our physicians and nurses honor that trust, and truly appreciate the opportunity to care for you and your loved ones. We also value your feedback. If you receive a survey about your Emergency Department experience today, please fill it out. We care about our patients' feedback, and we listen to what you have to say. Please read over your discharge instructions as these contain pertinent information to help you in the healing process. These instructions include a list of prescriptions you were given today. Follow-up information is also noted on your discharge papers. There are attached instructions and information pertaining to the reason why you were seen in the emergency department today. These discharge instructions may not be for exactly why you were here, but may be the closest available instructions that we have. These include important advice for things that you can do at home to feel better, and reasons to return to the emergency department. The evaluation and treatment you received in the emergency department is not always definitive care. If follow-up with your primary care doctor or specialist was recommended, it is important that you make these appointments for follow-up care. You may need further testing, procedures, and/or medications to help you feel better. Further tests may be required that are not available in the emergency department. Failure to make these follow-up appointments may jeopardize your health. The emergency department is here for emergent stabilization and evaluation of life and limb threatening illness and/or injuries.   Further care through a specialist or primary care doctor may be required to assist in your healing and complete your treatment and/or evaluation. We may not always be able to make a diagnosis in the emergency department, or things may change that will alter your diagnosis. Our primary goal is to ensure that nothing serious is occurring and that you are stable to continue your treatment and evaluation at home as an outpatient. Of course, if things change, and you feel worse, you are always encouraged to return to the emergency department for re-evaluation. Lab Results Today:  Recent Results (from the past 8 hour(s))   URINALYSIS W/ REFLEX CULTURE    Collection Time: 01/05/22 10:37 AM    Specimen: Urine   Result Value Ref Range    Color YELLOW/STRAW      Appearance CLEAR CLEAR      Specific gravity 1.017 1.003 - 1.030      pH (UA) 7.0 5.0 - 8.0      Protein Negative NEG mg/dL    Glucose Negative NEG mg/dL    Ketone Negative NEG mg/dL    Bilirubin Negative NEG      Blood Negative NEG      Urobilinogen 0.2 0.2 - 1.0 EU/dL    Nitrites Negative NEG      Leukocyte Esterase Negative NEG      WBC 0-4 0 - 4 /hpf    RBC 0-5 0 - 5 /hpf    Epithelial cells FEW FEW /lpf    Bacteria Negative NEG /hpf    UA:UC IF INDICATED CULTURE NOT INDICATED BY UA RESULT CNI      Hyaline cast 0-2 0 - 5 /lpf   CBC W/O DIFF    Collection Time: 01/05/22 11:39 AM   Result Value Ref Range    WBC 5.7 4.1 - 11.1 K/uL    RBC 5.25 4. 10 - 5.70 M/uL    HGB 14.5 12.1 - 17.0 g/dL    HCT 42.3 36.6 - 50.3 %    MCV 80.6 80.0 - 99.0 FL    MCH 27.6 26.0 - 34.0 PG    MCHC 34.3 30.0 - 36.5 g/dL    RDW 12.9 11.5 - 14.5 %    PLATELET 101 757 - 439 K/uL    MPV 8.4 (L) 8.9 - 12.9 FL    NRBC 0.0 0  WBC    ABSOLUTE NRBC 0.00 0.00 - 7.20 K/uL   METABOLIC PANEL, BASIC    Collection Time: 01/05/22 11:39 AM   Result Value Ref Range    Sodium 139 136 - 145 mmol/L    Potassium 4.4 3.5 - 5.1 mmol/L    Chloride 104 97 - 108 mmol/L    CO2 30 21 - 32 mmol/L    Anion gap 5 5 - 15 mmol/L    Glucose 87 65 - 100 mg/dL    BUN 10 6 - 20 MG/DL    Creatinine 1.08 0.70 - 1.30 MG/DL    BUN/Creatinine ratio 9 (L) 12 - 20      GFR est AA >60 >60 ml/min/1.73m2    GFR est non-AA >60 >60 ml/min/1.73m2    Calcium 9.7 8.5 - 10.1 MG/DL   LACTIC ACID    Collection Time: 01/05/22 11:39 AM   Result Value Ref Range    Lactic acid 1.4 0.4 - 2.0 MMOL/L        Radiology Results Today:  CT ABD PELV W CONT    Result Date: 1/5/2022  No acute intraperitoneal process is identified. Please see above for additional nonemergent incidental findings. US SCROTUM/TESTICLES    Result Date: 1/5/2022  1. Nonreducible fat-containing right inguinal hernia. 2.  Slightly asymmetrically diminished color flow of the right testicle compared to the left, nonspecific, but can occasionally be seen in intermittent torsion. Correlate clinically. 3.  Incidental right testicular microlithiasis.

## 2022-01-05 NOTE — ED PROVIDER NOTES
EMERGENCY DEPARTMENT HISTORY AND PHYSICAL EXAM      Date: 1/5/2022  Patient Name: Mary Gonzales    History of Presenting Illness     Chief Complaint   Patient presents with    Groin Pain     hx right sided inguinal hernia, last night felt a popped followed by pressure. pain has continued today. pain radiates down to perinium. denies urinary s/s       History Provided By: Patient    HPI: Mary Gonzales, 32 y.o. male  presents to the ED with cc of right inguinal pain. Patient states he has had a right-sided inguinal hernia for several weeks. Was apparently a Worker's Compensation injury and did not follow-up with surgery but he does have an appointment for tomorrow morning. He states of the past couple days pain has become increasingly worse in the right inguinal area and groin. He has been constipated but no diarrhea or blood in his stool. Occasional nausea but no vomiting. No hematuria or urologic symptoms. No prior history of hernias. Past History     Past Medical History:  Past Medical History:   Diagnosis Date    Epilepsy [facility])        Past Surgical History:  No past surgical history on file. Medications:  No current facility-administered medications on file prior to encounter. Current Outpatient Medications on File Prior to Encounter   Medication Sig Dispense Refill    polyethylene glycol (Miralax) 17 gram/dose powder Take 17 g by mouth daily. 1 tablespoon with 8 oz of water daily for occasional constipation 116 g 0    [DISCONTINUED] ibuprofen (MOTRIN) 600 mg tablet Take 1 Tablet by mouth every six (6) hours as needed for Pain. 20 Tablet 0    dicyclomine (BENTYL) 20 mg tablet Take 1 Tablet by mouth every six to eight (6-8) hours as needed for Abdominal Cramps. 28 Tablet 0    Cetirizine (ZyrTEC) 10 mg cap Take  by mouth daily as needed.  oytowfcuu-MM-wnbhmgpz-guaifen (Mucinex Cold,Flu,Sore Throat) 10- mg/20 mL liqd Take  by mouth four (4) times daily as needed.       acetaminophen (TylenoL) 325 mg tablet Take  by mouth every four (4) hours as needed for Pain.  fexofenadine HCl (ALLEGRA PO) Take 20 mg by mouth. Family History:  Family History   Problem Relation Age of Onset    Cancer Maternal Grandmother        Social History:  Social History     Tobacco Use    Smoking status: Never Smoker    Smokeless tobacco: Never Used   Substance Use Topics    Alcohol use: Not Currently    Drug use: Yes     Types: Marijuana       Allergies:  No Known Allergies    All the above components of the past  history are auto-populated from the electronic record. They have been reviewed and the patient has been interviewed for any pertinent past history that pertains to the patient's chief complaint and reason for visit. Not all pre-populated components may be accurate at the time this note was generated. Review of Systems   Review of Systems   Constitutional: Negative for chills and fever. HENT: Negative for congestion, ear pain, rhinorrhea, sore throat and trouble swallowing. Eyes: Negative for visual disturbance. Respiratory: Negative for cough, chest tightness and shortness of breath. Cardiovascular: Negative for chest pain and palpitations. Gastrointestinal: Positive for constipation and nausea. Negative for abdominal pain, blood in stool, diarrhea and vomiting. Genitourinary: Negative for decreased urine volume, difficulty urinating, dysuria and frequency. Right groin/inguinal pain   Musculoskeletal: Negative for back pain and neck pain. Skin: Negative for color change and rash. Neurological: Negative for dizziness, weakness, light-headedness and headaches. Physical Exam   Physical Exam  Vitals and nursing note reviewed. Constitutional:       General: He is not in acute distress. Appearance: He is well-developed. He is not ill-appearing. HENT:      Head: Normocephalic.    Eyes:      Conjunctiva/sclera: Conjunctivae normal. Cardiovascular:      Rate and Rhythm: Normal rate and regular rhythm. Pulmonary:      Effort: Pulmonary effort is normal. No accessory muscle usage or respiratory distress. Abdominal:      General: There is no distension. Hernia: There is no hernia in the right inguinal area. Genitourinary:     Testes: Normal.         Right: Tenderness not present. Epididymis:      Right: Tenderness present. Comments: Right inguinal area and right scrotum is tender to palpation but I cannot palpate an obvious hernia. Musculoskeletal:      Cervical back: Normal range of motion. Lymphadenopathy:      Lower Body: No right inguinal adenopathy. Skin:     General: Skin is warm and dry. Neurological:      Mental Status: He is alert and oriented to person, place, and time. Due to the COVID-19 pandemic, in order to reduce the spread and transmission of the virus, some basic elements of the physical exam have been deferred to reduce direct or close contact with the patient unless they are deemed to be absolutely necessary, regardless of whether the virus is highly suspected or not.       Diagnostic Study Results     Labs -     Recent Results (from the past 24 hour(s))   URINALYSIS W/ REFLEX CULTURE    Collection Time: 01/05/22 10:37 AM    Specimen: Urine   Result Value Ref Range    Color YELLOW/STRAW      Appearance CLEAR CLEAR      Specific gravity 1.017 1.003 - 1.030      pH (UA) 7.0 5.0 - 8.0      Protein Negative NEG mg/dL    Glucose Negative NEG mg/dL    Ketone Negative NEG mg/dL    Bilirubin Negative NEG      Blood Negative NEG      Urobilinogen 0.2 0.2 - 1.0 EU/dL    Nitrites Negative NEG      Leukocyte Esterase Negative NEG      WBC 0-4 0 - 4 /hpf    RBC 0-5 0 - 5 /hpf    Epithelial cells FEW FEW /lpf    Bacteria Negative NEG /hpf    UA:UC IF INDICATED CULTURE NOT INDICATED BY UA RESULT CNI      Hyaline cast 0-2 0 - 5 /lpf   CBC W/O DIFF    Collection Time: 01/05/22 11:39 AM   Result Value Ref Range    WBC 5.7 4.1 - 11.1 K/uL    RBC 5.25 4. 10 - 5.70 M/uL    HGB 14.5 12.1 - 17.0 g/dL    HCT 42.3 36.6 - 50.3 %    MCV 80.6 80.0 - 99.0 FL    MCH 27.6 26.0 - 34.0 PG    MCHC 34.3 30.0 - 36.5 g/dL    RDW 12.9 11.5 - 14.5 %    PLATELET 381 463 - 861 K/uL    MPV 8.4 (L) 8.9 - 12.9 FL    NRBC 0.0 0  WBC    ABSOLUTE NRBC 0.00 0.00 - 0.97 K/uL   METABOLIC PANEL, BASIC    Collection Time: 01/05/22 11:39 AM   Result Value Ref Range    Sodium 139 136 - 145 mmol/L    Potassium 4.4 3.5 - 5.1 mmol/L    Chloride 104 97 - 108 mmol/L    CO2 30 21 - 32 mmol/L    Anion gap 5 5 - 15 mmol/L    Glucose 87 65 - 100 mg/dL    BUN 10 6 - 20 MG/DL    Creatinine 1.08 0.70 - 1.30 MG/DL    BUN/Creatinine ratio 9 (L) 12 - 20      GFR est AA >60 >60 ml/min/1.73m2    GFR est non-AA >60 >60 ml/min/1.73m2    Calcium 9.7 8.5 - 10.1 MG/DL   LACTIC ACID    Collection Time: 01/05/22 11:39 AM   Result Value Ref Range    Lactic acid 1.4 0.4 - 2.0 MMOL/L       Radiologic Studies -   US SCROTUM/TESTICLES   Final Result   1. Nonreducible fat-containing right inguinal hernia. 2.  Slightly asymmetrically diminished color flow of the right testicle compared   to the left, nonspecific, but can occasionally be seen in intermittent torsion. Correlate clinically. 3.  Incidental right testicular microlithiasis. CT ABD PELV W CONT   Final Result   No acute intraperitoneal process is identified. Please see above for additional nonemergent incidental findings. CT Results  (Last 48 hours)               01/05/22 1216  CT ABD PELV W CONT Final result    Impression:  No acute intraperitoneal process is identified. Please see above for additional nonemergent incidental findings. Narrative:      CLINICAL HISTORY: Right inguinal hernia   INDICATION: Right inguinal hernia pain   COMPARISON: 8/4/2021.    CONTRAST: 100  ml Isovue 370   TECHNIQUE:    Multislice helical CT was performed of the abdomen and pelvis following uneventful rapid bolus intravenous contrast administration. Oral contrast was   not administered. Contiguous 5 mm axial images were reconstructed and lung and   soft tissue windows were generated. Coronal and sagittal reformations were   generated. CT dose reduction was achieved through use of a standardized   protocol tailored for this examination and automatic exposure control for dose   modulation. FINDINGS:   LUNG BASES:No mass lesion or effusion. LIVER: No mass or biliary dilatation. There is no intrahepatic duct dilatation. There is no hepatic parenchymal mass. Hepatic enhancement pattern is within   normal limits. Portal vein is patent. GALLBLADDER:  No dilatation or wall thickening. SPLEEN/PANCREAS: No mass. There is no pancreatic duct dilatation. There is no   evidence of splenomegaly. ADRENALS/KIDNEYS: No mass. There is no hydronephrosis. There is no renal mass. There is no perinephric mass. STOMACH: No dilatation or wall thickening. COLON AND SMALL BOWEL: Fecal stasis. There is no free intraperitoneal air. There   is no evidence of incarceration or obstruction. No mesenteric adenopathy. PERITONEUM: Unremarkable. APPENDIX: Unremarkable. BLADDER/REPRODUCTIVE ORGANS: No mass or calculus. RETROPERITONEUM: Unremarkable. The abdominal aorta is normal in caliber. No   aneurysm. No retroperitoneal adenopathy. OSSEOUS STRUCTURES: No destructive bone lesion. CXR Results  (Last 48 hours)    None            Medical Decision Making     I reviewed the vital signs, available nursing notes, past medical history, past surgical history, family history and social history. Vital Signs-I have reviewed the vital signs that have been made available during the patient's emergency department visit.   The vital signs auto-populated below are obtained mostly by electronic means through monitoring devices that have been downloaded into the patient's chart by the nursing staff.  Some vital signs are not downloaded into the chart until after the patient has been discharged and this note has been completed, therefore some vital signs may not be available to the physician for review prior to patient's discharge or admission. The physician has reviewed the patient's triage vital signs, monitored the electronic monitoring devices remotely for any significant vital sign abnormalities, and have reviewed vital signs prior to discharge. Some vital signs reviewed at bedside or remotely utilizing electronic monitoring devices may be different than the vital signs downloaded into the electronic medical record. Some vital signs may be erroneous and inaccurate since they are obtained by electronic monitoring devices, and not all vital signs are verified for accuracy by nursing staff prior to downloading into the patient's chart. Patient Vitals for the past 24 hrs:   Temp Pulse Resp BP SpO2   01/05/22 1030 97.9 °F (36.6 °C) 90 18 138/82 100 %         Records Reviewed: Nursing notes for today's visit have been reviewed. I have also reviewed most recent medical records pertinent to today's complaints, if available in our medical record system. I have also reviewed all labs and imaging results from previous results in comparison to results obtained today. If an EKG was obtained today, it has been compared to previous EKGs, if available. If arriving via EMS, the EMS report has been reviewed if made available to us within the patient's time in the emergency department. Provider Notes (Medical Decision Making):   Patient presents with pain in the right inguinal area that is been present for several weeks. He was previously told based on a clinical exam that he has a hernia. He is following up with surgery tomorrow but with worsening pain he came to the ER today. CT imaging initially obtained to assess the hernia and to look for any incarceration of bowel noted no abnormalities.   Ultrasound was obtained to assess the scrotal contents and did see a fat-containing hernia. It was also noted by the radiologist that the patient had diminished flow to the right testicle. I was not expecting the results of the diminished flow to the right testicle and it was mentioned in the radiology report that this could indicate intermittent torsion. I clinically do not suspect torsion however was such a high risk and high liability diagnosis I have asked urology to see the patient. They evaluated the patient and do not feel that this represents torsion nor need follow-up with him. I will recommend the patient see his surgeon tomorrow as scheduled. ED Course:   Initial assessment performed. The patients presenting problems have been discussed, and they are in agreement with the care plan formulated and outlined with them. I have encouraged them to ask questions as they arise throughout their visit. Orders Placed This Encounter    CT ABD PELV W CONT    US SCROTUM/TESTICLES    URINALYSIS W/ REFLEX CULTURE    CBC W/O DIFF    BASIC METABOLIC PANEL    LACTIC ACID, PLASMA    IP CONSULT TO UROLOGY    iopamidoL (ISOVUE-370) 76 % injection 100 mL    ibuprofen (MOTRIN) 800 mg tablet       Procedures      Critical Care Time:   0    Disposition:  Discharge    The patient's emergency department evaluation is now complete. I have reviewed all labs, imaging, and pertinent information. I have discussed all results with the patient and/or family. Based on our evaluation today I do believe that the patient is safe to be discharged home. The patient has been provided with at home instructions that are pertinent to their complaint today, although these may not be specific to the exact diagnosis. I have reviewed the patient's home medications and attempted to reconcile if not already done so by pharmacy or nursing staff. I have discussed all new prescriptions with the patient.   The patient has been encouraged to follow-up with primary care doctor and/or specialist, and these have been discussed with the patient. The patient has been advised that they may return to the emergency department if they have any worsening symptoms and or new symptoms that are of concern to them. Verbal discharge instructions may have also been provided to the patient that may not be specifically contained in the written discharge instructions. The patient has been given opportunity to ask questions prior to discharge. PLAN:  1. Current Discharge Medication List      CONTINUE these medications which have CHANGED    Details   ibuprofen (MOTRIN) 800 mg tablet Take 1 Tablet by mouth every eight (8) hours as needed for Pain. Qty: 30 Tablet, Refills: 0  Start date: 1/5/2022           2. Follow-up Information     Follow up With Specialties Details Why Contact Info    Rudy Johns MD General Surgery Go in 1 day  [address] [place] [address] [address]  [phone]          Return to ED if worse     Diagnosis     Clinical Impression:   1.  Right inguinal hernia

## 2022-01-05 NOTE — CONSULTS
Urology Consult    Patient: Danielle Harrell MRN: [id number]  SSN: [id number]    YOB: 1995  Age: 32 y.o. Sex: male          Date of Consultation:  January 5, 2022  Requesting Physician: Christine Golden MD  Reason for Consultation: scrotal pain            Assessment/Plan:  Known inguinal hernia from work injury   Right scrotal pain- asymmetrical diminished color flow on scrotal US today compared to the left testicle. Nonspecific. Upon exam inguinal pain referred from hernia. No acute  process. No torsion. Hernia could be intermittently compressing flow causing intermittent pain. Pt to continue with GS follow up OP tomorrow. Okay to d/c from urology standpoint. Supervising MD, Dr. Ortiz Irby     History of Present Illness:  Patient is a 32 y.o. male admitted 1/5/2022 to the hospital for No admission diagnoses are documented for this encounter. Zena Wendiepe He presented to the ED today with complaint of persistent groin and RLQ for several weeks. He has had a known right sided inguinal hernia that resulted from a worker's compensation injury several weeks ago and did not follow up with general surgery. He has planned follow up with them tomorrow morning. The reason he came to the ED today he states is that is pain that started a few weeks ago has gotten worse over the past couple of days. He also complains of constipation. No hematuria or voiding dysfunction. Denies urologic hx including hx of testicular torsion. Denies prior hx of hernias. Upon exam both testicles non tender to touch. Normal testicular lie. No swelling. Too painful when attempting inguinal hernia exam. Source of referred pain. CT abd 1/5/21 reviewed:  IMPRESSION    No acute intraperitoneal process is identified. ARABELLA 1/5/21 reviewed:    IMPRESSION  1. Nonreducible fat-containing right inguinal hernia.   2.  Slightly asymmetrically diminished color flow of the right testicle compared  to the left, nonspecific, but can occasionally be seen in intermittent torsion. Correlate clinically. 3.  Incidental right testicular microlithiasis.        Past Medical History:  No Known Allergies   Prior to Admission medications    Medication Sig Start Date End Date Taking? Authorizing Provider   polyethylene glycol (Miralax) 17 gram/dose powder Take 17 g by mouth daily. 1 tablespoon with 8 oz of water daily for occasional constipation 11/22/21   FRANCIS Kennedy   ibuprofen (MOTRIN) 600 mg tablet Take 1 Tablet by mouth every six (6) hours as needed for Pain. 11/13/21   Jose De Jesus Renteria DO   dicyclomine (BENTYL) 20 mg tablet Take 1 Tablet by mouth every six to eight (6-8) hours as needed for Abdominal Cramps. 8/4/21   Kristian Morales MD   Cetirizine (ZyrTEC) 10 mg cap Take  by mouth daily as needed. Other, MD Opal   cjrdvruzu-MT-gvijrntt-guaifen (Mucinex Cold,Flu,Sore Throat) 10- mg/20 mL liqd Take  by mouth four (4) times daily as needed. Other, MD Opal   acetaminophen (TylenoL) 325 mg tablet Take  by mouth every four (4) hours as needed for Pain. Other, MD Opal   fexofenadine HCl (ALLEGRA PO) Take 20 mg by mouth. Provider, Historical      PMHx:  has a past medical history of Epilepsy ([facility] [address] PSurgHx:  has no past surgical history on file. PSocHx:  reports that he has never smoked. He has never used smokeless tobacco. He reports previous alcohol use. He reports current drug use. Drug: Marijuana. ROS:  Admission ROS by No admitting provider for patient encounter. from 1/5/2022 were reviewed with the patient and changes (other than per HPI) include: none. Physical Exam    General Appearance: NAD, awake  HENT: atraumatic, normal ears  Cardiovascular: not tachycardic, no LE edema  Respiratory: no distress, room air  Abdomen: soft, no suprapubic fullness or tenderness  : no CVA tenderness, too painful for inguinal hernia exam   Scrotal exam - normal testicular lie. No masses, no nodules.    Extremities: moves all  Musculoskeletal: normal alignment of neck and head  Neuro: Appropriate, no focal neurological deficits  Mood/Affect: appropriate, A&O x 3        Lab Results   Component Value Date/Time    WBC 5.7 01/05/2022 11:39 AM    HCT 42.3 01/05/2022 11:39 AM    PLATELET 147 09/55/4092 11:39 AM    Sodium 139 01/05/2022 11:39 AM    Potassium 4.4 01/05/2022 11:39 AM    Chloride 104 01/05/2022 11:39 AM    CO2 30 01/05/2022 11:39 AM    BUN 10 01/05/2022 11:39 AM    Creatinine 1.08 01/05/2022 11:39 AM    Glucose 87 01/05/2022 11:39 AM    Calcium 9.7 01/05/2022 11:39 AM    Magnesium 2.4 03/10/2019 12:49 PM       UA:   Lab Results   Component Value Date/Time    Color YELLOW/STRAW 01/05/2022 10:37 AM    Appearance CLEAR 01/05/2022 10:37 AM    Specific gravity 1.017 01/05/2022 10:37 AM    Specific gravity 1.010 08/04/2021 09:12 PM    pH (UA) 7.0 01/05/2022 10:37 AM    Protein Negative 01/05/2022 10:37 AM    Glucose Negative 01/05/2022 10:37 AM    Ketone Negative 01/05/2022 10:37 AM    Bilirubin Negative 01/05/2022 10:37 AM    Urobilinogen 0.2 01/05/2022 10:37 AM    Nitrites Negative 01/05/2022 10:37 AM    Leukocyte Esterase Negative 01/05/2022 10:37 AM    Epithelial cells FEW 01/05/2022 10:37 AM    Bacteria Negative 01/05/2022 10:37 AM    WBC 0-4 01/05/2022 10:37 AM    RBC 0-5 01/05/2022 10:37 AM           Signed By: Geno Cole NP  - January 5, 2022

## 2022-01-05 NOTE — ED NOTES
Pt arrives to ED room from triage with a cc of left sided groin pain x 2 days. Pt states he was laying on his back and went to roll over and heard a pop while moving. Pt states his pain radiates to his left buttocks. Pt is alert and oriented x 4.

## 2022-01-05 NOTE — TELEPHONE ENCOUNTER
CALLED PT TO VERIFY INSURANCE. PT STATED [facility] WAS CURRENT. CALLED [facility], [facility] REP STATED PTS INS. ENDED 11/27/21 AND CURRENTLY HAS NO ACTIVE COVERAGE. CB TO INFORM PT, PT STATED HE WAS UNAWARE. PT STATED THAT INJURY IS WORK RELATED AND WAS ORIGINALLY SUPPOSED TO BE BILLED THROUGH [employer]. PT STATES HE HAS HAD TROUBLE CONTACTING THE POINT OF CONTACT FOR [employer]. PT STATES IT HAS BEEN OVER A MONTH AND IS UNSURE IF IT WILL STILL BE ABLE TO BE BILLED AS [employer]. INFORMED PT THAT AS OF NOW [facility] IS BILLED AS A SELF PAY.

## 2022-01-06 ENCOUNTER — OFFICE VISIT (OUTPATIENT)
Dept: SURGERY | Age: 27
End: 2022-01-06
Payer: MEDICAID

## 2022-01-06 VITALS
TEMPERATURE: 97.3 F | HEIGHT: 69 IN | DIASTOLIC BLOOD PRESSURE: 82 MMHG | WEIGHT: 193 LBS | SYSTOLIC BLOOD PRESSURE: 122 MMHG | RESPIRATION RATE: 16 BRPM | BODY MASS INDEX: 28.58 KG/M2 | HEART RATE: 88 BPM | OXYGEN SATURATION: 99 %

## 2022-01-06 DIAGNOSIS — K40.90 RIGHT INGUINAL HERNIA: Primary | ICD-10-CM

## 2022-01-06 DIAGNOSIS — K42.9 UMBILICAL HERNIA WITHOUT OBSTRUCTION AND WITHOUT GANGRENE: ICD-10-CM

## 2022-01-06 PROCEDURE — 99204 OFFICE O/P NEW MOD 45 MIN: CPT | Performed by: SURGERY

## 2022-01-06 RX ORDER — SENNOSIDES 8.6 MG/1
1 TABLET ORAL
COMMUNITY

## 2022-01-06 NOTE — H&P (VIEW-ONLY)
HISTORY OF PRESENT ILLNESS  Joseph Luo is a 32 y.o. male. Seen in the ER yesterday with right testicular pain  CT+ fecal stasis  US + RIH, relative decreased flow to the right testicle. Evaluated by Dr. Barrett Parker. Intermittent decreased flow thought to be related to the hernia. First noticed two months ago  progressively worse pain  occ nausea  appetite ok  BMs inconsistence  Using occ Senakot    ____________________________________________________________________________  Patient presents with:  Possible Hernia: Seen at the request of Dr Pillo Wang for eval of a possible hernia      /82 (BP 1 Location: Left upper arm, BP Patient Position: Sitting, BP Cuff Size: Adult)   Pulse 88   Temp 97.3 °F (36.3 °C) (Temporal)   Resp 16   Ht 5' 9\" (1.753 m)   Wt 87.5 kg (193 lb)   SpO2 99%   BMI 28.50 kg/m²   Past Medical History:  No date: Epilepsy [facility])  Past Surgical History:  No date: HX OTHER SURGICAL      Comment:  [place] teeth extraction at 12years of age  No date: HX OTHER SURGICAL      Comment:  tube in ears as a child  Social History    Socioeconomic History      Marital status: SINGLE    Tobacco Use      Smoking status: Never Smoker      Smokeless tobacco: Never Used    Vaping Use      Vaping Use: Every day    Substance and Sexual Activity      Alcohol use: Not Currently      Drug use: Yes        Types: Marijuana    Review of patient's family history indicates:  Problem: Cancer      Relation: Maternal Grandmother          Age of Onset: (Not Specified)    No current outpatient medications on file. No current facility-administered medications for this visit. Allergies: No Known Allergies  _____________________________________________________________________________          Possible Hernia  The history is provided by the patient. This is a new problem. The current episode started more than 1 week ago. The problem occurs daily. The problem has been gradually worsening.  Pertinent negatives include no chest pain, no abdominal pain, no headaches and no shortness of breath. The symptoms are aggravated by exertion. The symptoms are relieved by rest. The treatment provided no relief. Review of Systems   Constitutional: Negative for chills, fever and weight loss. HENT: Negative for ear pain. Eyes: Negative for pain. Respiratory: Negative for shortness of breath. Cardiovascular: Negative for chest pain. Gastrointestinal: Negative for abdominal pain and blood in stool. Genitourinary: Negative for hematuria. Musculoskeletal: Negative for joint pain. Skin: Negative for rash. Neurological: Negative for dizziness, focal weakness, seizures and headaches. Endo/Heme/Allergies: Does not bruise/bleed easily. Psychiatric/Behavioral: The patient does not have insomnia. Physical Exam  Constitutional:       General: He is not in acute distress. Appearance: He is well-developed. He is not diaphoretic. HENT:      Head: Normocephalic and atraumatic. Mouth/Throat:      Pharynx: No oropharyngeal exudate. Eyes:      Pupils: Pupils are equal, round, and reactive to light. Neck:      Trachea: No tracheal deviation. Cardiovascular:      Rate and Rhythm: Normal rate and regular rhythm. Heart sounds: Normal heart sounds. No murmur heard. Pulmonary:      Effort: Pulmonary effort is normal. No respiratory distress. Breath sounds: Normal breath sounds. No wheezing. Abdominal:      General: Bowel sounds are normal. There is no distension. Palpations: Abdomen is soft. There is no mass. Tenderness: There is no abdominal tenderness. There is no guarding or rebound. Hernia: A hernia is present. Hernia is present in the umbilical area and right inguinal area. Genitourinary:     Testes:         Right: Mass or tenderness not present. Left: Mass or tenderness not present. Musculoskeletal:         General: No tenderness. Normal range of motion. Cervical back: Normal range of motion. Lymphadenopathy:      Cervical: No cervical adenopathy. Lower Body: No right inguinal adenopathy. No left inguinal adenopathy. Skin:     General: Skin is warm. Findings: No erythema or rash. Neurological:      Mental Status: He is alert and oriented to person, place, and time. Psychiatric:         Behavior: Behavior normal.         ASSESSMENT and PLAN    ICD-10-CM ICD-9-CM    1. Right inguinal hernia  K40.90 550.90    2. Umbilical hernia without obstruction and without gangrene  K42.9 553.1        Iraj Horta is having symptoms. I have recommended to him that we proceed with surgery. I had an extensive discussion with him regarding the risks, benefits, and alternatives of proceeding with a Laparoscopic Right, Possible Bilateral Inguinal Hernia Repair with Mesh, Robot Assisted and umbilical hernia repair. Risks,benefits, and alternatives were discussed including the risk of anesthesia, bleeding, infection, including mesh infection, chronic orchialgia, neuralgia, other pain syndromes, testicular ischemia, conversion to open, injury to bowel, and recurrence were discussed. He is in agreement to proceed. All questions were answered. Iraj Horta will undergo preoperative evaluation and will proceed provided he is medically stable. We will schedule him at his earliest convenience.

## 2022-01-06 NOTE — PROGRESS NOTES
HISTORY OF PRESENT ILLNESS  Berhane Lynch is a 32 y.o. male. Seen in the ER yesterday with right testicular pain  CT+ fecal stasis  US + RIH, relative decreased flow to the right testicle. Evaluated by Dr. Ev Estrada. Intermittent decreased flow thought to be related to the hernia. First noticed two months ago  progressively worse pain  occ nausea  appetite ok  BMs inconsistence  Using occ Senakot    ____________________________________________________________________________  Patient presents with:  Possible Hernia: Seen at the request of Dr Sherley Louis for eval of a possible hernia      /82 (BP 1 Location: Left upper arm, BP Patient Position: Sitting, BP Cuff Size: Adult)   Pulse 88   Temp 97.3 °F (36.3 °C) (Temporal)   Resp 16   Ht 5' 9\" (1.753 m)   Wt 87.5 kg (193 lb)   SpO2 99%   BMI 28.50 kg/m²   Past Medical History:  No date: Epilepsy [facility])  Past Surgical History:  No date: HX OTHER SURGICAL      Comment:  [place] teeth extraction at 12years of age  No date: HX OTHER SURGICAL      Comment:  tube in ears as a child  Social History    Socioeconomic History      Marital status: SINGLE    Tobacco Use      Smoking status: Never Smoker      Smokeless tobacco: Never Used    Vaping Use      Vaping Use: Every day    Substance and Sexual Activity      Alcohol use: Not Currently      Drug use: Yes        Types: Marijuana    Review of patient's family history indicates:  Problem: Cancer      Relation: Maternal Grandmother          Age of Onset: (Not Specified)    No current outpatient medications on file. No current facility-administered medications for this visit. Allergies: No Known Allergies  _____________________________________________________________________________          Possible Hernia  The history is provided by the patient. This is a new problem. The current episode started more than 1 week ago. The problem occurs daily. The problem has been gradually worsening.  Pertinent negatives include no chest pain, no abdominal pain, no headaches and no shortness of breath. The symptoms are aggravated by exertion. The symptoms are relieved by rest. The treatment provided no relief. Review of Systems   Constitutional: Negative for chills, fever and weight loss. HENT: Negative for ear pain. Eyes: Negative for pain. Respiratory: Negative for shortness of breath. Cardiovascular: Negative for chest pain. Gastrointestinal: Negative for abdominal pain and blood in stool. Genitourinary: Negative for hematuria. Musculoskeletal: Negative for joint pain. Skin: Negative for rash. Neurological: Negative for dizziness, focal weakness, seizures and headaches. Endo/Heme/Allergies: Does not bruise/bleed easily. Psychiatric/Behavioral: The patient does not have insomnia. Physical Exam  Constitutional:       General: He is not in acute distress. Appearance: He is well-developed. He is not diaphoretic. HENT:      Head: Normocephalic and atraumatic. Mouth/Throat:      Pharynx: No oropharyngeal exudate. Eyes:      Pupils: Pupils are equal, round, and reactive to light. Neck:      Trachea: No tracheal deviation. Cardiovascular:      Rate and Rhythm: Normal rate and regular rhythm. Heart sounds: Normal heart sounds. No murmur heard. Pulmonary:      Effort: Pulmonary effort is normal. No respiratory distress. Breath sounds: Normal breath sounds. No wheezing. Abdominal:      General: Bowel sounds are normal. There is no distension. Palpations: Abdomen is soft. There is no mass. Tenderness: There is no abdominal tenderness. There is no guarding or rebound. Hernia: A hernia is present. Hernia is present in the umbilical area and right inguinal area. Genitourinary:     Testes:         Right: Mass or tenderness not present. Left: Mass or tenderness not present. Musculoskeletal:         General: No tenderness. Normal range of motion. Cervical back: Normal range of motion. Lymphadenopathy:      Cervical: No cervical adenopathy. Lower Body: No right inguinal adenopathy. No left inguinal adenopathy. Skin:     General: Skin is warm. Findings: No erythema or rash. Neurological:      Mental Status: He is alert and oriented to person, place, and time. Psychiatric:         Behavior: Behavior normal.         ASSESSMENT and PLAN    ICD-10-CM ICD-9-CM    1. Right inguinal hernia  K40.90 550.90    2. Umbilical hernia without obstruction and without gangrene  K42.9 553.1        Berhane Lynch is having symptoms. I have recommended to him that we proceed with surgery. I had an extensive discussion with him regarding the risks, benefits, and alternatives of proceeding with a Laparoscopic Right, Possible Bilateral Inguinal Hernia Repair with Mesh, Robot Assisted and umbilical hernia repair. Risks,benefits, and alternatives were discussed including the risk of anesthesia, bleeding, infection, including mesh infection, chronic orchialgia, neuralgia, other pain syndromes, testicular ischemia, conversion to open, injury to bowel, and recurrence were discussed. He is in agreement to proceed. All questions were answered. Berhane Lynch will undergo preoperative evaluation and will proceed provided he is medically stable. We will schedule him at his earliest convenience.

## 2022-01-06 NOTE — PROGRESS NOTES
Identified pt with two pt identifiers(name and ). Reviewed record in preparation for visit and have obtained necessary documentation. All patient medications has been reviewed. Chief Complaint   Patient presents with    Possible Hernia     Seen at the request of Dr Gabriel Jones for eval of [facility] hernia        Health Maintenance Due   Topic    Hepatitis C Screening     COVID-19 Vaccine (1)    DTaP/Tdap/Td series (1 - Tdap)    Flu Vaccine (1)       Vitals:    22 0859   BP: 122/82   Pulse: 88   Resp: 16   Temp: 97.3 °F (36.3 °C)   TempSrc: Temporal   SpO2: 99%   Weight: 87.5 kg (193 lb)   Height: 5' 9\" (1.753 m)   PainSc:   2   PainLoc: Groin       4. Have you been to the ER, urgent care clinic since your last visit? Hospitalized since your last visit? Yes, ED [facility] 22 for pain in groin     5. Have you seen or consulted any other health care providers outside of the [address] [place] [address] since your last visit? Include any pap smears or colon screening. No    Patient is accompanied by self I have received verbal consent from Miranda Roa to discuss any/all medical information while they are present in the room.

## 2022-01-06 NOTE — PERIOP NOTES
[facility]  Preoperative Instructions        Surgery Date 1/11/2022          Time of Arrival : to be called  Covid Testing 1/7/2022    1. On the day of your surgery, please report to the Surgical Services Registration Desk and sign in at your designated time. The Surgery Center is located to the right of the Emergency Room. 2. You must have someone with you to drive you home. You should not drive a car for 24 hours following surgery. Please make arrangements for a friend or family member to stay with you for the first 24 hours after your surgery. 3. Do not have anything to eat or drink (including water, gum, mints, coffee, juice) after midnight. ?This may not apply to medications prescribed by your physician. ?(Please note below the special instructions with medications to take the morning of your procedure.)    4. We recommend you do not drink any alcoholic beverages for 24 hours before and after your surgery. 5. Contact your surgeons office for instructions on the following medications: non-steroidal anti-inflammatory drugs (i.e. Advil, Aleve), vitamins, and supplements. (Some surgeons will want you to stop these medications prior to surgery and others may allow you to take them)  **If you are currently taking Plavix, Coumadin, Aspirin and/or other blood-thinning agents, contact your surgeon for instructions. ** Your surgeon will partner with the physician prescribing these medications to determine if it is safe to stop or if you need to continue taking. Please do not stop taking these medications without instructions from your surgeon    6. Wear comfortable clothes. Wear glasses instead of contacts. Do not bring any money or jewelry. Please bring picture ID, insurance card, and any prearranged co-payment or hospital payment. Do not wear make-up, particularly mascara the morning of your surgery. Do not wear nail polish, particularly if you are having foot /hand surgery. Wear your hair loose or down, no ponytails, buns, glendy pins or clips. All body piercings must be removed. Please shower with antibacterial soap for three consecutive days before and on the morning of surgery, but do not apply any lotions, powders or deodorants after the shower on the day of surgery. Please use a fresh towels after each shower. Please sleep in clean clothes and change bed linens the night before surgery. Please do not shave for 48 hours prior to surgery. Shaving of the face is acceptable. 7. You should understand that if you do not follow these instructions your surgery may be cancelled. If your physical condition changes (I.e. fever, cold or flu) please contact your surgeon as soon as possible. 8. It is important that you be on time. If a situation occurs where you may be late, please call [phone] (OR Holding Area). 9. If you have any questions and or problems, please call [phone] (Pre-admission Testing). 10. Your surgery time may be subject to change. You will receive a phone call the evening prior if your time changes. 11.  If having outpatient surgery, you must have someone to drive you here, stay with you during the duration of your stay, and to drive you home at time of discharge. Special Instructions:     TAKE ALL MEDICATIONS DAY OF SURGERY EXCEPT: no exceptions      I understand a pre-operative phone call will be made to verify my surgery time. In the event that I am not available, I give permission for a message to be left on my answering service and/or with another person?   Yes [phone]         ___________________      __________   _________    (Signature of Patient)             (Witness)                (Date and Time)

## 2022-01-07 ENCOUNTER — HOSPITAL ENCOUNTER (OUTPATIENT)
Dept: PREADMISSION TESTING | Age: 27
Discharge: HOME | End: 2022-01-07
Payer: MEDICAID

## 2022-01-07 LAB
SARS-COV-2, XPLCVT: NOT DETECTED
SOURCE, COVRS: NORMAL

## 2022-01-07 PROCEDURE — U0005 INFEC AGEN DETEC AMPLI PROBE: HCPCS

## 2022-01-11 ENCOUNTER — ANESTHESIA EVENT (OUTPATIENT)
Dept: SURGERY | Age: 27
End: 2022-01-11
Payer: MEDICAID

## 2022-01-11 ENCOUNTER — ANESTHESIA (OUTPATIENT)
Dept: SURGERY | Age: 27
End: 2022-01-11
Payer: MEDICAID

## 2022-01-11 ENCOUNTER — HOSPITAL ENCOUNTER (OUTPATIENT)
Age: 27
Setting detail: OUTPATIENT SURGERY
Discharge: HOME | End: 2022-01-11
Attending: SURGERY | Admitting: SURGERY
Payer: MEDICAID

## 2022-01-11 VITALS
OXYGEN SATURATION: 100 % | HEART RATE: 85 BPM | DIASTOLIC BLOOD PRESSURE: 80 MMHG | TEMPERATURE: 97.5 F | RESPIRATION RATE: 16 BRPM | WEIGHT: 195.11 LBS | HEIGHT: 69 IN | BODY MASS INDEX: 28.9 KG/M2 | SYSTOLIC BLOOD PRESSURE: 118 MMHG

## 2022-01-11 DIAGNOSIS — R52 PAIN: Primary | ICD-10-CM

## 2022-01-11 PROCEDURE — 74011250636 HC RX REV CODE- 250/636: Performed by: NURSE ANESTHETIST, CERTIFIED REGISTERED

## 2022-01-11 PROCEDURE — 77030003029 HC SUT VCRL J&J -B: Performed by: SURGERY

## 2022-01-11 PROCEDURE — 77030019908 HC STETH ESOPH SIMS -A: Performed by: ANESTHESIOLOGY

## 2022-01-11 PROCEDURE — 77030016151 HC PROTCTR LNS DFOG COVD -B: Performed by: SURGERY

## 2022-01-11 PROCEDURE — 77030002933 HC SUT MCRYL J&J -A: Performed by: SURGERY

## 2022-01-11 PROCEDURE — 77030010939 HC CLP LIG TELE -B: Performed by: SURGERY

## 2022-01-11 PROCEDURE — 76010000934 HC OR TIME 1 TO 1.5HR INTENSV - TIER 2: Performed by: SURGERY

## 2022-01-11 PROCEDURE — 49650 LAP ING HERNIA REPAIR INIT: CPT | Performed by: SURGERY

## 2022-01-11 PROCEDURE — 74011250636 HC RX REV CODE- 250/636

## 2022-01-11 PROCEDURE — 2709999900 HC NON-CHARGEABLE SUPPLY: Performed by: SURGERY

## 2022-01-11 PROCEDURE — 74011000250 HC RX REV CODE- 250: Performed by: SURGERY

## 2022-01-11 PROCEDURE — 74011250636 HC RX REV CODE- 250/636: Performed by: ANESTHESIOLOGY

## 2022-01-11 PROCEDURE — 49585 PR REPAIR UMBILICAL HERN,5+Y/O,REDUC: CPT | Performed by: SURGERY

## 2022-01-11 PROCEDURE — 77030035277 HC OBTRTR BLDELSS DISP INTU -B: Performed by: SURGERY

## 2022-01-11 PROCEDURE — 77030008514 HC TBNG INSUF ENDO CNMD -B: Performed by: SURGERY

## 2022-01-11 PROCEDURE — 76060000033 HC ANESTHESIA 1 TO 1.5 HR: Performed by: SURGERY

## 2022-01-11 PROCEDURE — 74011250637 HC RX REV CODE- 250/637: Performed by: SURGERY

## 2022-01-11 PROCEDURE — 77030008684 HC TU ET CUF COVD -B: Performed by: ANESTHESIOLOGY

## 2022-01-11 PROCEDURE — 77030002966 HC SUT PDS J&J -A: Performed by: SURGERY

## 2022-01-11 PROCEDURE — 77030018673: Performed by: SURGERY

## 2022-01-11 PROCEDURE — 77030040361 HC SLV COMPR DVT MDII -B: Performed by: SURGERY

## 2022-01-11 PROCEDURE — 74011000250 HC RX REV CODE- 250: Performed by: NURSE ANESTHETIST, CERTIFIED REGISTERED

## 2022-01-11 PROCEDURE — 77030038613 HC SUT PDS STRATA SPIRL J&J -B: Performed by: SURGERY

## 2022-01-11 PROCEDURE — 77030008771 HC TU NG SALEM SUMP -A: Performed by: ANESTHESIOLOGY

## 2022-01-11 PROCEDURE — 76210000026 HC REC RM PH II 1 TO 1.5 HR: Performed by: SURGERY

## 2022-01-11 PROCEDURE — 77030013079 HC BLNKT BAIR HGGR 3M -A: Performed by: ANESTHESIOLOGY

## 2022-01-11 PROCEDURE — 76210000016 HC OR PH I REC 1 TO 1.5 HR: Performed by: SURGERY

## 2022-01-11 PROCEDURE — 77030026438 HC STYL ET INTUB CARD -A: Performed by: ANESTHESIOLOGY

## 2022-01-11 PROCEDURE — 74011250636 HC RX REV CODE- 250/636: Performed by: SURGERY

## 2022-01-11 PROCEDURE — 77030020703 HC SEAL CANN DISP INTU -B: Performed by: SURGERY

## 2022-01-11 PROCEDURE — 77030031139 HC SUT VCRL2 J&J -A: Performed by: SURGERY

## 2022-01-11 PROCEDURE — C1781 MESH (IMPLANTABLE): HCPCS | Performed by: SURGERY

## 2022-01-11 DEVICE — MESH HERN W12XL15CM RECT PREPERI BIOMATERIAL COMP POLYPR: Type: IMPLANTABLE DEVICE | Site: INGUINAL | Status: FUNCTIONAL

## 2022-01-11 RX ORDER — SODIUM CHLORIDE, SODIUM LACTATE, POTASSIUM CHLORIDE, CALCIUM CHLORIDE 600; 310; 30; 20 MG/100ML; MG/100ML; MG/100ML; MG/100ML
25 INJECTION, SOLUTION INTRAVENOUS CONTINUOUS
Status: DISCONTINUED | OUTPATIENT
Start: 2022-01-11 | End: 2022-01-11 | Stop reason: HOSPADM

## 2022-01-11 RX ORDER — MIDAZOLAM HYDROCHLORIDE 1 MG/ML
INJECTION, SOLUTION INTRAMUSCULAR; INTRAVENOUS AS NEEDED
Status: DISCONTINUED | OUTPATIENT
Start: 2022-01-11 | End: 2022-01-11 | Stop reason: HOSPADM

## 2022-01-11 RX ORDER — ROCURONIUM BROMIDE 10 MG/ML
INJECTION, SOLUTION INTRAVENOUS AS NEEDED
Status: DISCONTINUED | OUTPATIENT
Start: 2022-01-11 | End: 2022-01-11 | Stop reason: HOSPADM

## 2022-01-11 RX ORDER — PROPOFOL 10 MG/ML
INJECTION, EMULSION INTRAVENOUS AS NEEDED
Status: DISCONTINUED | OUTPATIENT
Start: 2022-01-11 | End: 2022-01-11 | Stop reason: HOSPADM

## 2022-01-11 RX ORDER — IBUPROFEN 600 MG/1
600 TABLET ORAL
Qty: 25 TABLET | Refills: 1 | Status: SHIPPED | OUTPATIENT
Start: 2022-01-11 | End: 2022-01-16

## 2022-01-11 RX ORDER — PHENYLEPHRINE HCL IN 0.9% NACL 0.4MG/10ML
SYRINGE (ML) INTRAVENOUS AS NEEDED
Status: DISCONTINUED | OUTPATIENT
Start: 2022-01-11 | End: 2022-01-11 | Stop reason: HOSPADM

## 2022-01-11 RX ORDER — KETOROLAC TROMETHAMINE 30 MG/ML
INJECTION, SOLUTION INTRAMUSCULAR; INTRAVENOUS
Status: COMPLETED
Start: 2022-01-11 | End: 2022-01-11

## 2022-01-11 RX ORDER — HYDROMORPHONE HYDROCHLORIDE 2 MG/ML
INJECTION, SOLUTION INTRAMUSCULAR; INTRAVENOUS; SUBCUTANEOUS AS NEEDED
Status: DISCONTINUED | OUTPATIENT
Start: 2022-01-11 | End: 2022-01-11 | Stop reason: HOSPADM

## 2022-01-11 RX ORDER — KETOROLAC TROMETHAMINE 30 MG/ML
30 INJECTION, SOLUTION INTRAMUSCULAR; INTRAVENOUS ONCE
Status: COMPLETED | OUTPATIENT
Start: 2022-01-11 | End: 2022-01-11

## 2022-01-11 RX ORDER — LIDOCAINE HYDROCHLORIDE 20 MG/ML
INJECTION, SOLUTION EPIDURAL; INFILTRATION; INTRACAUDAL; PERINEURAL AS NEEDED
Status: DISCONTINUED | OUTPATIENT
Start: 2022-01-11 | End: 2022-01-11 | Stop reason: HOSPADM

## 2022-01-11 RX ORDER — FENTANYL CITRATE 50 UG/ML
25 INJECTION, SOLUTION INTRAMUSCULAR; INTRAVENOUS
Status: DISCONTINUED | OUTPATIENT
Start: 2022-01-11 | End: 2022-01-11 | Stop reason: HOSPADM

## 2022-01-11 RX ORDER — ONDANSETRON 4 MG/1
4 TABLET, ORALLY DISINTEGRATING ORAL
Qty: 8 TABLET | Refills: 0 | Status: SHIPPED | OUTPATIENT
Start: 2022-01-11 | End: 2022-01-26

## 2022-01-11 RX ORDER — ACETAMINOPHEN 500 MG
1000 TABLET ORAL 4 TIMES DAILY
Status: SHIPPED | COMMUNITY
Start: 2022-01-11 | End: 2022-01-26

## 2022-01-11 RX ORDER — MIDAZOLAM HYDROCHLORIDE 1 MG/ML
1 INJECTION, SOLUTION INTRAMUSCULAR; INTRAVENOUS AS NEEDED
Status: DISCONTINUED | OUTPATIENT
Start: 2022-01-11 | End: 2022-01-11 | Stop reason: HOSPADM

## 2022-01-11 RX ORDER — FENTANYL CITRATE 50 UG/ML
INJECTION, SOLUTION INTRAMUSCULAR; INTRAVENOUS AS NEEDED
Status: DISCONTINUED | OUTPATIENT
Start: 2022-01-11 | End: 2022-01-11 | Stop reason: HOSPADM

## 2022-01-11 RX ORDER — DEXAMETHASONE SODIUM PHOSPHATE 4 MG/ML
INJECTION, SOLUTION INTRA-ARTICULAR; INTRALESIONAL; INTRAMUSCULAR; INTRAVENOUS; SOFT TISSUE AS NEEDED
Status: DISCONTINUED | OUTPATIENT
Start: 2022-01-11 | End: 2022-01-11 | Stop reason: HOSPADM

## 2022-01-11 RX ORDER — ONDANSETRON 2 MG/ML
4 INJECTION INTRAMUSCULAR; INTRAVENOUS AS NEEDED
Status: DISCONTINUED | OUTPATIENT
Start: 2022-01-11 | End: 2022-01-11 | Stop reason: HOSPADM

## 2022-01-11 RX ORDER — OXYCODONE HYDROCHLORIDE 5 MG/1
5 TABLET ORAL ONCE
Status: COMPLETED | OUTPATIENT
Start: 2022-01-11 | End: 2022-01-11

## 2022-01-11 RX ORDER — CEFAZOLIN SODIUM 1 G/3ML
2 INJECTION, POWDER, FOR SOLUTION INTRAMUSCULAR; INTRAVENOUS ONCE
Status: COMPLETED | OUTPATIENT
Start: 2022-01-11 | End: 2022-01-11

## 2022-01-11 RX ORDER — POLYETHYLENE GLYCOL 3350 17 G/17G
17 POWDER, FOR SOLUTION ORAL DAILY
Status: SHIPPED | COMMUNITY
Start: 2022-01-11 | End: 2022-01-26

## 2022-01-11 RX ORDER — BUPIVACAINE HYDROCHLORIDE AND EPINEPHRINE 2.5; 5 MG/ML; UG/ML
INJECTION, SOLUTION EPIDURAL; INFILTRATION; INTRACAUDAL; PERINEURAL AS NEEDED
Status: DISCONTINUED | OUTPATIENT
Start: 2022-01-11 | End: 2022-01-11 | Stop reason: HOSPADM

## 2022-01-11 RX ORDER — LIDOCAINE HYDROCHLORIDE 10 MG/ML
0.1 INJECTION, SOLUTION EPIDURAL; INFILTRATION; INTRACAUDAL; PERINEURAL AS NEEDED
Status: DISCONTINUED | OUTPATIENT
Start: 2022-01-11 | End: 2022-01-11 | Stop reason: HOSPADM

## 2022-01-11 RX ORDER — HYDROMORPHONE HYDROCHLORIDE 1 MG/ML
.2-.5 INJECTION, SOLUTION INTRAMUSCULAR; INTRAVENOUS; SUBCUTANEOUS
Status: DISCONTINUED | OUTPATIENT
Start: 2022-01-11 | End: 2022-01-11 | Stop reason: HOSPADM

## 2022-01-11 RX ORDER — GLYCOPYRROLATE 0.2 MG/ML
INJECTION INTRAMUSCULAR; INTRAVENOUS AS NEEDED
Status: DISCONTINUED | OUTPATIENT
Start: 2022-01-11 | End: 2022-01-11 | Stop reason: HOSPADM

## 2022-01-11 RX ORDER — ONDANSETRON 2 MG/ML
INJECTION INTRAMUSCULAR; INTRAVENOUS AS NEEDED
Status: DISCONTINUED | OUTPATIENT
Start: 2022-01-11 | End: 2022-01-11 | Stop reason: HOSPADM

## 2022-01-11 RX ORDER — DEXMEDETOMIDINE HYDROCHLORIDE 100 UG/ML
INJECTION, SOLUTION INTRAVENOUS AS NEEDED
Status: DISCONTINUED | OUTPATIENT
Start: 2022-01-11 | End: 2022-01-11 | Stop reason: HOSPADM

## 2022-01-11 RX ORDER — FENTANYL CITRATE 50 UG/ML
50 INJECTION, SOLUTION INTRAMUSCULAR; INTRAVENOUS AS NEEDED
Status: DISCONTINUED | OUTPATIENT
Start: 2022-01-11 | End: 2022-01-11 | Stop reason: HOSPADM

## 2022-01-11 RX ORDER — OXYCODONE HYDROCHLORIDE 5 MG/1
5 TABLET ORAL
Qty: 12 TABLET | Refills: 0 | Status: SHIPPED | OUTPATIENT
Start: 2022-01-11 | End: 2022-01-18

## 2022-01-11 RX ORDER — NEOSTIGMINE METHYLSULFATE 1 MG/ML
INJECTION, SOLUTION INTRAVENOUS AS NEEDED
Status: DISCONTINUED | OUTPATIENT
Start: 2022-01-11 | End: 2022-01-11 | Stop reason: HOSPADM

## 2022-01-11 RX ADMIN — CEFAZOLIN 2 G: 1 INJECTION, POWDER, FOR SOLUTION INTRAMUSCULAR; INTRAVENOUS; PARENTERAL at 12:30

## 2022-01-11 RX ADMIN — SODIUM CHLORIDE, POTASSIUM CHLORIDE, SODIUM LACTATE AND CALCIUM CHLORIDE 25 ML/HR: 600; 310; 30; 20 INJECTION, SOLUTION INTRAVENOUS at 11:36

## 2022-01-11 RX ADMIN — PROPOFOL 200 MG: 10 INJECTION, EMULSION INTRAVENOUS at 12:20

## 2022-01-11 RX ADMIN — ONDANSETRON HYDROCHLORIDE 4 MG: 2 INJECTION, SOLUTION INTRAMUSCULAR; INTRAVENOUS at 13:00

## 2022-01-11 RX ADMIN — FENTANYL CITRATE 100 MCG: 50 INJECTION, SOLUTION INTRAMUSCULAR; INTRAVENOUS at 12:15

## 2022-01-11 RX ADMIN — Medication 4 MG: at 13:25

## 2022-01-11 RX ADMIN — Medication 60 MCG: at 13:03

## 2022-01-11 RX ADMIN — ROCURONIUM BROMIDE 40 MG: 10 INJECTION INTRAVENOUS at 12:20

## 2022-01-11 RX ADMIN — LIDOCAINE HYDROCHLORIDE 100 MG: 20 INJECTION, SOLUTION INTRAVENOUS at 12:20

## 2022-01-11 RX ADMIN — KETOROLAC TROMETHAMINE 30 MG: 30 INJECTION, SOLUTION INTRAMUSCULAR; INTRAVENOUS at 13:58

## 2022-01-11 RX ADMIN — Medication 60 MCG: at 12:37

## 2022-01-11 RX ADMIN — MIDAZOLAM HYDROCHLORIDE 2 MG: 1 INJECTION, SOLUTION INTRAMUSCULAR; INTRAVENOUS at 12:15

## 2022-01-11 RX ADMIN — GLYCOPYRROLATE 0.6 MG: 0.2 INJECTION, SOLUTION INTRAMUSCULAR; INTRAVENOUS at 13:25

## 2022-01-11 RX ADMIN — Medication 3 AMPULE: at 11:23

## 2022-01-11 RX ADMIN — HYDROMORPHONE HYDROCHLORIDE 0.4 MG: 2 INJECTION, SOLUTION INTRAMUSCULAR; INTRAVENOUS; SUBCUTANEOUS at 13:40

## 2022-01-11 RX ADMIN — Medication 60 MCG: at 12:56

## 2022-01-11 RX ADMIN — DEXMEDETOMIDINE HYDROCHLORIDE 4 MCG: 100 INJECTION, SOLUTION, CONCENTRATE INTRAVENOUS at 13:30

## 2022-01-11 RX ADMIN — Medication 60 MCG: at 12:53

## 2022-01-11 RX ADMIN — DEXMEDETOMIDINE HYDROCHLORIDE 6 MCG: 100 INJECTION, SOLUTION, CONCENTRATE INTRAVENOUS at 12:28

## 2022-01-11 RX ADMIN — Medication 60 MCG: at 12:40

## 2022-01-11 RX ADMIN — OXYCODONE 5 MG: 5 TABLET ORAL at 15:10

## 2022-01-11 RX ADMIN — DEXAMETHASONE SODIUM PHOSPHATE 8 MG: 4 INJECTION, SOLUTION INTRAMUSCULAR; INTRAVENOUS at 12:40

## 2022-01-11 NOTE — PERIOP NOTES
8327 Report received from MARIYA Ordonez RN. 1500 Report given to [facility]. Pt and all belongings transported to phase II.

## 2022-01-11 NOTE — PERIOP NOTES
1542:  Discharge instructions reviewed with the patient's friend Galdino Friend via telephone. She is on the way to the hospital to pick him up. Discharge info was also reviewed with the patient. They both verbaize understanding of all info.    1600:  Patient is dressed & ambulating around the room. He is talking on the phone. 1615:  Patient voided 275 of clear yellow urine without difficulty. 1620:  IV removed. 1625:  Patient discharged from hospital with all belongings.

## 2022-01-11 NOTE — ROUTINE PROCESS
TRANSFER - IN REPORT:    Verbal report received from [facility] CRNA(name) on Jeniffer Wilkinson  being received from OR(unit) for routine post - op      Report consisted of patients Situation, Background, Assessment and   Recommendations(SBAR). Information from the following report(s) OR Summary was reviewed with the receiving nurse. Opportunity for questions and clarification was provided. Assessment completed upon patients arrival to unit and care assumed.

## 2022-01-11 NOTE — ANESTHESIA POSTPROCEDURE EVALUATION
Post-Anesthesia Evaluation and Assessment    Patient: Sadie Olivera MRN: [id number]  SSN: [id number]    YOB: 1995  Age: 32 y.o. Sex: male      I have evaluated the patient and they are stable and ready for discharge from the PACU. Cardiovascular Function/Vital Signs  Visit Vitals  /68   Pulse 82   Temp 36.9 °C (98.5 °F)   Resp 14   Ht 5' 9\" (1.753 m)   Wt 88.5 kg (195 lb 1.7 oz)   SpO2 96%   BMI 28.81 kg/m²       Patient is status post General anesthesia for Procedure(s):  ROBOTIC ASSISTED LAPAROSCOPIC RIGHT  INGUINAL HERNIA REPAIR WITH MESH AND UMBILICAL HERNIA REPAIR. Nausea/Vomiting: None    Postoperative hydration reviewed and adequate. Pain:  Pain Scale 1: Numeric (0 - 10) (01/11/22 1430)  Pain Intensity 1: 0 (01/11/22 1430)   Managed    Neurological Status:   Neuro (WDL): Exceptions to WDL (01/11/22 1400)  Neuro  Neurologic State: Drowsy; Eyes open spontaneously;Sleeping (01/11/22 1400)  Orientation Level: Oriented to person;Oriented to place;Oriented to situation (01/11/22 1400)  Cognition: Follows commands (01/11/22 1400)  Speech: Clear (01/11/22 1400)  LUE Motor Response: Purposeful (01/11/22 1400)  LLE Motor Response: Purposeful (01/11/22 1400)  RUE Motor Response: Purposeful (01/11/22 1400)  RLE Motor Response: Purposeful (01/11/22 1400)   At baseline    Mental Status, Level of Consciousness: Alert and  oriented to person, place, and time    Pulmonary Status:   O2 Device: None (Room air) (01/11/22 1430)   Adequate oxygenation and airway patent    Complications related to anesthesia: None    Post-anesthesia assessment completed. No concerns    Signed By: Jan Yarbrough MD     January 11, 2022              Procedure(s):  ROBOTIC ASSISTED LAPAROSCOPIC RIGHT  INGUINAL HERNIA REPAIR WITH MESH AND UMBILICAL HERNIA REPAIR.     general    <[email or web address]>    INITIAL Post-op Vital signs:   Vitals Value Taken Time   /71 01/11/22 1435   Temp 36.9 °C (98.5 °F) 01/11/22 1345   Pulse 81 01/11/22 1444   Resp 11 01/11/22 1444   SpO2 96 % 01/11/22 1444   Vitals shown include unvalidated device data.

## 2022-01-11 NOTE — DISCHARGE INSTRUCTIONS
Dr. Jonathan Joy Discharge Instructions for:  Iraj Horta    MRN: [id number] : 1995    Admitted: 2022  Discharged: 2022       What to do at [address]: Resume your usual diet    Recommended activity: Lift less than 10 lbs for 4 weeks. Follow-up with Dr. Mike Hopper in 2-3 weeks. Call [phone] for an appointment. Inguinal Hernia Repair: What to Expect at [address] are likely to have pain for the next few days. You may also feel like you have the flu, and you may have a low fever and feel tired and nauseated. This is common. You should feel better after a few days and will probably feel much better in 7 days. For several weeks you may feel twinges or pulling in the hernia repair when you move. You may have some bruising on the scrotum and along the penis. This is normal.    Men will need to wear a jockstrap or briefs, not boxers, for scrotal support for several days after a groin (inguinal) hernia repair. [employer] bicycle shorts may provide good support. This care sheet gives you a general idea about how long it will take for you to recover. But each person recovers at a different pace. Follow the steps below to get better as quickly as possible. How can you care for yourself at home? Activity  Rest when you feel tired. Getting enough sleep will help you recover. You can try to sleep with your head up in a recliner chair if that is more comfortable. Try to walk each day. Start by walking a little more than you did the day before. Bit by bit, increase the amount you walk. Walking boosts blood flow and helps prevent pneumonia and constipation. Put ice or a cold pack on the area of your hernia repair for 10 to 15 minutes at a time. Try to do this every 1 to 2 hours for the first 24 hours (when you are awake) or until the swelling goes down. Put a thin cloth between the ice and your skin.   Avoid strenuous activities, such as biking, jogging, weight lifting, or aerobic exercise, until your doctor says it is okay. Avoid lifting anything that would make you strain. This may include heavy grocery bags and milk containers, a heavy briefcase or backpack, cat litter or dog food bags, a child, or a vacuum . You may drive when you are no longer taking pain medicine and can quickly move your foot from the gas pedal to the brake. You must also be able to sit comfortably for a long period of time. Most people are able to return to work within 1 to 2 weeks after surgery. You may shower. Pat the cut (incision) dry. Do not take a bath for 2 weeks. You can have sex again when it feels comfortable to do so. Diet  You can eat your normal diet. If your stomach is upset, try bland, low-fat foods like plain rice, broiled chicken, toast, and yogurt. Drink plenty of fluids (unless your doctor tells you not to). You may notice that your bowel movements are not regular right after your surgery. This is common. Avoid constipation and straining with bowel movements. You may want to take a fiber supplement every day. If you have not had a bowel movement after a couple of days, ask your doctor about taking a mild laxative. Medicines  Take pain medicines exactly as directed. If the doctor gave you a prescription medicine for pain, take it as prescribed. If you are not taking a prescription pain medicine, take an over-the-counter medicine such as acetaminophen (Tylenol), ibuprofen (Advil, Motrin), or naproxen (Aleve). Read and follow all instructions on the label. Do not take two or more pain medicines at the same time unless the doctor told you to. Many pain medicines have acetaminophen, which is Tylenol. Too much acetaminophen (Tylenol) can be harmful. If your doctor prescribed antibiotics, take them as directed. Do not stop taking them just because you feel better. You need to take the full course of antibiotics.   If you think your pain medicine is making you sick to your stomach: Take your medicine after meals (unless your doctor has told you not to). Ask your doctor for a different pain medicine. Incision care  If you have strips of tape on the cut (incision) the doctor made, leave the tape on for a week or until it falls off. If you have staples closing the cut, you will need to visit your doctor in 1 to 2 weeks to have them removed. Wash the area daily with warm, soapy water and pat it dry. Follow-up care is a key part of your treatment and safety. Be sure to make and go to all appointments, and call your doctor if you are having problems. Its also a good idea to know your test results and keep a list of the medicines you take. When should you call for help? Call 911 anytime you think you may need emergency care. For example, call if:  You pass out (lose consciousness). You have sudden chest pain and shortness of breath, or you cough up blood. You have severe pain in your belly. Call your doctor now or seek immediate medical care if:  You are sick to your stomach and cannot keep fluids down. You have signs of a blood clot, such as:  Pain in your calf, back of the knee, thigh, or groin. Redness and swelling in your leg or groin. You have trouble passing urine or stool, especially if you have mild pain or swelling in your lower belly. You have hot flashes, sweating, flushing, or a fast or pounding heartbeat. Bright red blood has soaked through the bandage over your incision. Watch closely for any changes in your health, and be sure to contact your doctor if:  Your swelling is getting worse. Your swelling is not going down.         DISCHARGE SUMMARY from Nurse    PATIENT INSTRUCTIONS:    After general anesthesia or intravenous sedation, for 24 hours or while taking prescription Narcotics:  · Limit your activities  · Do not drive and operate hazardous machinery  · Do not make important personal or business decisions  · Do  not drink alcoholic beverages  · If you have not urinated within 8 hours after discharge, please contact your surgeon on call. Report the following to your surgeon:  · Excessive pain, swelling, redness or odor of or around the surgical area  · Temperature over 100.5  · Nausea and vomiting lasting longer than 4 hours or if unable to take medications  · Any signs of decreased circulation or nerve impairment to extremity: change in color, persistent  numbness, tingling, coldness or increase pain  · Any questions    A common side effect of anesthesia following surgery is nausea and/or vomiting. In order to decrease symptoms, it is wise to avoid foods that are high in fat, greasy foods, milk products, and spicy foods for the first 24 hours. Acceptable foods for the first 24 hours following surgery include but are not limited to:     soup   broth    toast    crackers    applesauce    bananas    mashed potatoes,   soft or scrambled eggs   oatmeal    jello    It is important to eat when taking your pain medication. This will help to prevent nausea. If possible, please try to time your meals with your medications. It is very important to stay hydrated following surgery. Sip fluids frequently while awake. Avoid acidic drinks such as citrus juices and soda for 24 hours. Carbonated beverages may cause bloating and gas. Acceptable fluids include:    - water (flavor packets may add variety)  - coffee or tea (in moderation)  - Gatorade  - Andre-aid  - apple juice  - cranberry juice    You are encouraged to cough and deep breathe every hour when awake. This will help to prevent respiratory complications following anesthesia. You may want to hug a pillow when coughing and sneezing to add additional support to the surgical area and to decrease discomfort if you had abdominal or chest surgery. If you are discharged home with support stockings, you may remove them after 24 hours.  Support stockings are used to help prevent blood clots in the legs following surgery. Please take time to review all of your Home Care Instructions and Medication Information sheets provided in your discharge packet. If you have any questions, please contact your surgeons office. Thank you. How to Care for Your Wound After Its Treated With  DERMABOND* Topical Skin Adhesive  DERMABOND* Topical Skin Adhesive (2-octyl cyanoacrylate) is a sterile, liquid skin adhesive  that holds wound edges together. The film will usually remain in place for 5 to 10 days, then  naturally fall off your skin. The following will answer some of your questions and provide instructions for proper care for your  wound while it is healing:    CHECK WOUND APPEARANCE   Some swelling, redness, and pain are common with all wounds and normally will go away as the  wound heals. If swelling, redness, or pain increases or if the wound feels warm to the touch,  contact a doctor. Also contact a doctor if the wound edges reopen or separate. REPLACE BANDAGES   If your wound is bandaged, keep the bandage dry.  Replace the dressing daily until the adhesive film has fallen off or if the  bandage should become wet, unless otherwise instructed by your  physician.  When changing the dressing, do not place tape directly over the  DERMABOND adhesive film, because removing the tape later may also  remove the film. AVOID TOPICAL MEDICATIONS   Do not apply liquid or ointment medications or any other product to your wound while the  DERMABOND adhesive film is in place. These may loosen the film before your wound is healed. KEEP WOUND DRY AND PROTECTED   You may occasionally and briefly wet your wound in the shower or bath. Do not soak or scrub  your wound, do not swim, and avoid periods of heavy perspiration until the DERMABOND  adhesive has naturally fallen off. After showering or bathing, gently blot your wound dry with a  soft towel.  If a protective dressing is being used, apply a fresh, dry bandage, being sure to keep  the tape off the DERMABOND adhesive film.  Apply a clean, dry bandage over the wound if necessary to protect it.  Protect your wound from injury until the skin has had sufficient time to heal.   Do not scratch, rub, or pick at the DERMABOND adhesive film. This may loosen the film before  your wound is healed.  Protect the wound from prolonged exposure to sunlight or tanning lamps while the film is in  place. If you have any questions or concerns about this product, please consult your doctor. *Trademark ©[employer] 7840AT PREVENT AN INFECTION      1. [address] YOUR HANDS     To prevent infection, good handwashing is the most important thing you or your caregiver can do.  Wash your hands with soap and water or use the hand  we gave you before you touch any wounds. 2. SHOWER     Use the antibacterial soap we gave you when you take a shower.  Shower with this soap until your wounds are healed.  To reach all areas of your body, you may need someone to help you.  Dont forget to clean your belly button with every shower. 3.  USE CLEAN SHEETS     Use freshly cleaned sheets on your bed after surgery.  To keep the surgery site clean, do not allow pets to sleep with you while your wound is still healing. 4. STOP SMOKING     Stop smoking, or at least cut back on smoking     Smoking slows your healing. 5.  CONTROL YOUR BLOOD SUGAR     High blood sugars slow wound healing. If you are diabetic, control your blood sugar levels before and after your surgery. Patient Education   Patient Education   Patient Education      Ondansetron (Zofran, Zofran ODT, Hamzah Bryson) - (By mouth, Into the mouth)   Why this medicine is used:   Prevents nausea and vomiting.   Contact a nurse or doctor right away if you have:  · Fast, pounding, or uneven heartbeat  · Lightheadedness or fainting  · Trouble breathing     Common side effects:  · Headache, tiredness  · Constipation, diarrhea  © 2017 [facility] Information is for End User's use only and may not be sold, redistributed or otherwise used for commercial purposes. Oxycodone, Rapid Release (ETH-Oxydose, Oxy IR, Roxicodone) - (By mouth)   Why this medicine is used:   Treats moderate to severe pain. This medicine is a narcotic pain reliever. Contact a nurse or doctor right away if you have:  · Fast or slow heart beat, shallow breathing, blue lips, skin or fingernails  · Anxiety, restlessness, fever, sweating, muscle spasms, twitching, seeing or hearing things that are not there  · Extreme weakness, shallow breathing, slow heartbeat  · Severe confusion, lightheadedness, dizziness, fainting  · Sweating or cold, clammy skin, seizures  · Severe constipation, stomach pain, nausea, vomiting     Common side effects:  · Mild constipation  · Sleepiness, tiredness  © 2017 [facility] Information is for End User's use only and may not be sold, redistributed or otherwise used for commercial purposes. Ibuprofen (Advil, Advil Children's, Motrin, Children's Ibuprofen) - (By mouth)   Why this medicine is used:   Treats pain and fever. This medicine is an NSAID. Contact a nurse or doctor right away if you have:  · Change in how much or how often you urinate  · Severe stomach pain, vomiting blood, bloody or black tarry stools  · Swelling in your hands, ankles, or feet; rapid weight gain     Common side effects:  · Constipation, diarrhea, gas, mild upset stomach  · Ringing in your ears, dizziness, headache  © 2017 [address] is for End User's use only and may not be sold, redistributed or otherwise used for commercial purposes.

## 2022-01-11 NOTE — INTERVAL H&P NOTE
Update History & Physical    The Patient's History and Physical attached was reviewed with the patient and I examined the patient. There is no change. The surgical site was confirmed by the patient and me. Plan:  The risk, benefits, expected outcome, and alternative to the recommended procedure have been discussed with Aaron Murphy. He understands and wants to proceed with the procedure. Sites marked.

## 2022-01-11 NOTE — ANESTHESIA PREPROCEDURE EVALUATION
Relevant Problems   No relevant active problems       Anesthetic History   No history of anesthetic complications            Review of Systems / Medical History  Patient summary reviewed, nursing notes reviewed and pertinent labs reviewed    Pulmonary  Within defined limits                 Neuro/Psych     seizures    Psychiatric history     Cardiovascular  Within defined limits                Exercise tolerance: >4 METS     GI/Hepatic/Renal     GERD           Endo/Other  Within defined limits           Other Findings              Physical Exam    Airway  Mallampati: III  TM Distance: 4 - 6 cm  Neck ROM: normal range of motion   Mouth opening: Normal     Cardiovascular    Rhythm: regular  Rate: normal         Dental    Dentition: Upper dentition intact and Lower dentition intact     Pulmonary  Breath sounds clear to auscultation               Abdominal  GI exam deferred       Other Findings            Anesthetic Plan    ASA: 2  Anesthesia type: general          Induction: Intravenous  Anesthetic plan and risks discussed with: Patient

## 2022-01-11 NOTE — OP NOTES
OPERATIVE REPORT  1/11/2022    PREOPERATIVE DIAGNOSES:  1.  right inguinal hernia. 2.  Umbilical hernia. POSTOPERATIVE DIAGNOSES:  1.  small RIGHT indirect inguinal hernia. 2.  Umbilical Hernia      OPERATIVE PROCEDURE:  1. Laparoscopic repair of right inguinal hernia with mesh, robot assisted. 2.  Umbilical hernia repair. SURGEON:  Emiliano Cavanaugh MD    OR STAFF: Circ-1: Eladio Colón RN  Scrub Tech-1: Mary BUENROSTRO  Surg Asst-1: Johnathan Patisma    ANESTHESIA:  General plus local.    Specimens: * No specimens in log *     COMPLICATIONS:  None. ESTIMATED BLOOD LOSS:  Minimal.       Event Time In   Incision Start 1236   Incision Close 1328        Implants:   Implant Name Type Inv. Item Serial No.  Lot No. LRB No. Used Action   MESH [facility] PRE RECT [id number] Grant Sanjiv [id number]  MESH SYNECR PRE RECT [id number] -- [place] [employer] [id number] [employer] N/A Right 1 Implanted         INDICATION FOR PROCEDURE: Garth Cooper is a 32 y.o. male with a history  of a symptomatic Right inguinal hernia. On exam, he was noted to have a hernia. He is brought to the operating room for repair. Risks, benefits and alternatives were discussed. Consent form  was placed in the chart. DESCRIPTION OF PROCEDURE: The patient was brought to operating room number 1, with consent form signed and on the chart and the site of surgery marked. After satisfactory induction of general anesthesia, the patient was kept in the supine position, bilateral arms tucked to his sides, with appropriate padding. The patient's abdomen was prepped and draped sterilely, including use of Ioban. After appropriate time-out was held, the skin was infused with local anesthetic, an incision was made on the left abdomen and an 8 mm optical entry trocar was placed intra-abdominally under visualization, and pneumoperitoneum was achieved. The abdomen was explored, with findings noted above.  An 8 mm robotic port was placed on the right side and an 8 mm robotic port placed through the hernia at the umbilicus. The patient was placed in Trendelenburg position, the robot was docked and robotic instruments were inserted. The abdomen was explored, with findings noted as above. The peritoneum was incised, and the peritoneal flap was created on the Right. This was carried down towards Malcolm's ligament medially and the abdominal side wall laterally. Appropriate landmarks were identified. The cord structures were dissected free of the peritoneum, and the peritoneum was taken down beyond the splaying of the cord and a small lipoma of the cord was reduced. Once dissection was completed, a 10 cm x 15 cm mesh was inserted and set into position. It was secured with a 3-0 PDS suture on Malcolm's ligament and an additional suture to the medial anterior abdominal wall. It was noted to be lying appropriately and in good position. Hemostasis was ensured. A 3-0 barbed absorbable suture was used to reapproximate the peritoneum. Once completed, the ports were removed sequentially under visualization, and the pneumoperitoneum was allowed to escape. Next, a curvilinear incision was extended cephalad to the umbilicus. The dermis and umbilicus was  from underlying sac. Hemostasis was assured. Repair was undertaken in a vertical fashion with interrupted 3-0 PDS sutures. This was a solid appearing repair. Hemostasis was noted. Additional local anesthetic was injected into the fascia and soft tissue surrounding the repair. The dermis of the umbilicus was tacked to the underlying repair with 3-0 Vicryl. Then, additional Vicryl was placed in the soft tissue, and all skin incisions were closed with subcuticular Monocryl and Dermabond. The patient remained stable during my presence in the operating room.

## 2022-01-13 ENCOUNTER — TELEPHONE (OUTPATIENT)
Dept: SURGERY | Age: 27
End: 2022-01-13

## 2022-01-13 NOTE — TELEPHONE ENCOUNTER
Patient had surgery on 1/11/22 and stated he is feeling very uncomfortable. Patient said he is having pain in his stomach and tried putting a pillow over his stomach to help when he coughed, when he did that he felt even more uncomfortable. The last time patient coughed he felt bulging in his groin where the hernia was, he stated his groin felt fine yesterday and today it is now swollen and in pain. Patient is concerned that some of his movements may have caused this pain and discomfort. Please give patient a call back as soon as possible.   [facility]# [phone]

## 2022-01-13 NOTE — TELEPHONE ENCOUNTER
Spoke with patient s/p robotic assisted lap right ing hernia repair with mesh and umb hernia repair 1/11. Patient says that he is having a lot of pain and that his right groin is a little swollen. Advised patient to alternate his ibuprofen and pain medication. Patient has only been taking the oxycodone q 6 hours. He says that he did not get any ibuprofen so I called that into the pharmacy for him. I also advised him to use a cool compress on his groin to help with the swelling. Patient says that he has been using a pillow when he coughs which helps. Patient says incisions look great, and he denies redness, swelling, fever drainage and is not warm to the touch. Patient will  ibuprofen and continue with cool compress. Patient voiced understanding with no further questions at this time.

## 2022-01-17 ENCOUNTER — TELEPHONE (OUTPATIENT)
Dept: SURGERY | Age: 27
End: 2022-01-17

## 2022-01-17 NOTE — TELEPHONE ENCOUNTER
Patient had surgery and he did something that may have caused complication to his recovery and he would like to talk with someone so that he can explain what happened, please call.

## 2022-01-17 NOTE — TELEPHONE ENCOUNTER
Spoke with Mr Denis Guzman and let him know Dr Yasmani Joshi is aware and agreed with plan of ice, elevation and motrin. He also wanted him to come into the office if he still had concerns. Patient said he is not as concerned as he was before and he has a f.u. already scheduled for 1/26/22.

## 2022-01-17 NOTE — TELEPHONE ENCOUNTER
Spoke with patient and he stated that he masturbated Saturday night and then again early am today. This am he heard a little pop just behind his penis and was afraid he may have done something to mess up his mesh and he was a little uncomfortable. Patient instructed to try to elevate his penis and scrotum on a rolled up towel or pillow and apply ice. He was also instructed take his motrin. I also spoke with Dr Renzo Resendez and he is in agreement with plan and said he can come in if he still has concerns. Tried to call patient back and there was no answer. Left message for him to call back.

## 2022-01-18 ENCOUNTER — TELEPHONE (OUTPATIENT)
Dept: SURGERY | Age: 27
End: 2022-01-18

## 2022-01-18 NOTE — TELEPHONE ENCOUNTER
Pt called very anxious, but describing everything healing as expected. Not taking anything for pain. +BMs appetite ok. No fevers. I reassured him that everything sounds fine, but told him to call the office if he would like to be seen sooner than his scheduled post-op visit.

## 2022-01-26 ENCOUNTER — OFFICE VISIT (OUTPATIENT)
Dept: SURGERY | Age: 27
End: 2022-01-26
Payer: MEDICAID

## 2022-01-26 VITALS
TEMPERATURE: 97.7 F | WEIGHT: 198.1 LBS | DIASTOLIC BLOOD PRESSURE: 60 MMHG | HEIGHT: 69 IN | SYSTOLIC BLOOD PRESSURE: 108 MMHG | HEART RATE: 75 BPM | RESPIRATION RATE: 16 BRPM | BODY MASS INDEX: 29.34 KG/M2 | OXYGEN SATURATION: 98 %

## 2022-01-26 DIAGNOSIS — Z09 POSTOPERATIVE EXAMINATION: Primary | ICD-10-CM

## 2022-01-26 PROCEDURE — 99024 POSTOP FOLLOW-UP VISIT: CPT | Performed by: SURGERY

## 2022-01-26 RX ORDER — IBUPROFEN 600 MG/1
600 TABLET ORAL
COMMUNITY
End: 2022-03-11

## 2022-01-26 NOTE — PROGRESS NOTES
Chief Complaint   Patient presents with    Surgical Follow-up     s/p Laparoscopic repair of right inguinal hernia with mesh, robot assisted and Umbilical hernia repair on 1/11/22     Feeling better than the first week  Tolerating PO  BMs unchanged    Pain controlled with motrin      Physical Exam:   Abdominal exam: soft  non-distended  appropriatly tender. Wound: clean, dry, no drainage    Doing well from the surgery  Thinks he may have a tooth abscess.   Encouraged him to be evaluated by dentist ASAP    Continue restricted activity for a total of 4 weeks  Follow-up: janette Cassidy MD FACS

## 2022-01-26 NOTE — PROGRESS NOTES
Identified pt with two pt identifiers(name and ). Reviewed record in preparation for visit and have obtained necessary documentation. All patient medications has been reviewed. Chief Complaint   Patient presents with    Surgical Follow-up     s/p Laparoscopic repair of right inguinal hernia with mesh, robot assisted and Umbilical hernia repair on 22       Health Maintenance Due   Topic    Hepatitis C Screening     COVID-19 Vaccine (1)    DTaP/Tdap/Td series (1 - Tdap)    Flu Vaccine (1)       Vitals:    22 1012   BP: 108/60   Pulse: 75   Resp: 16   Temp: 97.7 °F (36.5 °C)   TempSrc: Temporal   SpO2: 98%   Weight: 89.9 kg (198 lb 1.6 oz)   Height: 5' 9\" (1.753 m)   PainSc:   2   PainLoc: Abdomen       4. Have you been to the ER, urgent care clinic since your last visit? Hospitalized since your last visit? No    5. Have you seen or consulted any other health care providers outside of the [address] [place] [address] since your last visit? Include any pap smears or colon screening. No      Patient is accompanied by self I have received verbal consent from Dori Jarvis to discuss any/all medical information while they are present in the room.

## 2022-02-11 ENCOUNTER — OFFICE VISIT (OUTPATIENT)
Dept: ORTHOPEDIC SURGERY | Age: 27
End: 2022-02-11
Payer: MEDICAID

## 2022-02-11 VITALS — WEIGHT: 200 LBS | BODY MASS INDEX: 29.62 KG/M2 | HEIGHT: 69 IN

## 2022-02-11 DIAGNOSIS — G56.03 BILATERAL CARPAL TUNNEL SYNDROME: Primary | ICD-10-CM

## 2022-02-11 PROCEDURE — 99203 OFFICE O/P NEW LOW 30 MIN: CPT | Performed by: ORTHOPAEDIC SURGERY

## 2022-02-11 RX ORDER — MELOXICAM 15 MG/1
15 TABLET ORAL DAILY
Qty: 30 TABLET | Refills: 0 | Status: SHIPPED | OUTPATIENT
Start: 2022-02-11 | End: 2022-03-13

## 2022-02-11 NOTE — PATIENT INSTRUCTIONS
From the [address] [id number] for Surgery of the Hand    Carpal Tunnel Syndrome    What is Carpal Tunnel Syndrome? Carpal tunnel syndrome is a condition affecting one of the main nerves in the wrist area. The carpal tunnel is a space created by the natural arch of the wrist bones. A thick band called the transverse carpal ligament creates a roof to the tunnel. This means that the size of the tunnel cannot change, as the bones and ligament act like solid walls. Nine tendons that bend the fingers and thumb and the median nerve pass through the tunnel. The median nerve provides feeling (sensation) to the skin of the thumb, index and middle fingers, as well as half the ring finger. The nerve also provides the communication line to the muscles at the base of the thumb (thenar muscles). Figure 1        The median nerve and nine tendons pass from the forearm into the hand, where carpal tunnel syndrome happens. Figure 2        The ligament that would be cut during surgery for carpal tunnel syndrome      Causes      The most likely cause of carpal tunnel syndrome is extra pressure on the median nerve at the wrist inside the tunnel. This extra pressure can come from swelling (inflammation) of the contents inside the tunnel. When pressure results in nerve symptoms, it is called a compressive neuropathy. While the exact carpal tunnel syndrome causes are usually unknown and due to the patients personal anatomy, there are many factors that can contribute to the increased pressure or inflammation, including:    Rheumatoid arthritis  Gout  Amyloidosis  Infections  Psoriatic arthritis  Arthritic spurs of the carpal bones  Tumors  Ganglion cysts  Wrist fracture or dislocation of the wrist  Repetitive motions performed at work or home    Even making a tight closed fist or holding the wrist in bent or extended positions can put increased pressure on the median nerve.  A prolonged or constant fist or bend (like a fist during sleep, reading a book, or some other activities) may put enough pressure to cause the numbness/tingling. If the funny feeling in the fingers just began, this is easily resolved by moving the fingers back and forth and out of the position. If the pressure continues off and on for weeks to months, the symptoms may come on faster after the activity or wrist position is created. It may also take longer for the symptoms to go away after the activity stops. Eventually, symptoms can become constant. Repetitive activities in the workplace with forceful or repetitive gripping or vibration can also increase symptoms. However, it is complicated to determine if the work activity is the main cause of the symptoms or if work is incidentally just aggravating a condition that is already present (unrelated to work). The determination of cause of symptoms requires experienced and specialized health care providers to provide an opinion, taking many factors into account. There are some risk factors that increase the chances of getting carpal tunnel syndrome. For example, women are more likely than men to experience carpal tunnel syndrome. It is more likely to occur with aging. Each decade someone is alive, there are more people that experience carpal tunnel syndrome. Thus, it is rare in children and adolescents and more common in ages 36 and over. Carpal tunnel syndrome is more common in people with obesity, diabetes, alcohol addiction, fibromyalgia and hypothyroidism. If you have carpal tunnel syndrome, your children may be more likely to get it. Also, during pregnancy, hormonal changes and extra body fluid retention may add swelling and pressure into the tunnel. While symptoms can sometimes be worse at night, sleep has not been shown to cause carpal tunnel. Some people get symptoms while driving a car. This is also not necessarily the primary cause of the problem.  The wrist position during driving or sleep, for example, may simply aggravate the symptoms. Signs and Symptoms    Some of the symptoms of carpal tunnel syndrome may include:    Numbness and tingling that is often worse at night  Waking up at night, having to shake hand or hold over the side of the bed  Fingers feeling swollen or fuzzy  Dropping objects  Weak pinch  Discomfort in wrist, hand or fingers    The main symptom of carpal tunnel syndrome is numbness and/or tingling in the thumb, index and middle fingers, and all or half of the ring finger (the side closest to the thumb). In most cases of carpal tunnel syndrome, the numbness/tingling comes on gradually. If mild, the symptoms may come and go for months or even years without worsening. The symptoms can come and go during the day or at night. The symptoms may vary based on time of day, activity or wrist position. Sometimes the fingers are numb, and other times there is normal feeling. If the condition becomes worse, the numbness may become constant. The speed of symptom worsening can vary from very gradual where it is hard to notice, or the symptoms may come on all of a sudden. In the most severe cases, the muscles at the base of the thumb become weak and shrink in size. Dropping things may happen from both the numbness and/or thumb weakness. In some types of injuries where there is deformity from a fracture or dislocation or significant internal bleeding, carpal tunnel syndrome may come on rapidly. Because the pressure rises rapidly, the nerve does not have time to adjust. This often creates more severe symptoms with pain as a noticeable symptom, and treatment is more of an emergency. It the sudden and severe nerve pressure problem is linked to an injury, the recovery is more unpredictable and slower. Discomfort or pain can also happen with gradual onset carpal tunnel syndrome. However, pain is typically not the first or main symptom.  The pain from carpal tunnel syndrome usually only occurs after the numbness and tingling has started. The pain resolves when the numbness and tingling is alleviated. Shaking the hand or bending/straightening the fingers repeatedly may alleviate the numbness and tingling. The pain of carpal tunnel syndrome may be only in the hand or may be radiated to the forearm, even to the shoulder. It is important to understand that pain without median nerve finger numbness or tingling is NOT usually carpal tunnel syndrome. Treatment    Diagnosis of Carpal Tunnel Syndrome    The diagnosis of carpal tunnel syndrome is made in many patients based on their history of symptoms and a physical examination. It is important to know which fingers experience the numbness or tingling and which fingers do not. When performing the physical exam, your hand surgeon will perform sensation testing on the palm side and the back side of the fingers and hand. He or she may also perform sensory testing of the forearm and arm because finding numbness outside the median nerve area may suggest a different problem. The doctor may also perform some tests including the Phalens maneuver, the Tinels test, and a compression test. These tests are designed to increase pressure on the median nerve to cause your symptoms to appear. Electrodiagnostic studies (EMG) may also be used in the diagnosis. These provide evidence of nerve function or dysfunction. Also, they can help to find other causes of numbness which may have similar symptoms, such as diabetic neuropathy or cervical radiculopathy. Magnetic Resonance Imaging (MRI) and ultrasound studies are anatomic imaging tests that visualize the size of the median nerve and may also be used. Anatomic imaging tests are often helpful when other conditions are suspected like a ligament tear or tendinitis that may contribute to pain. Therefore, it is possible that your doctor will diagnose you with a condition other than carpal tunnel syndrome.  Other conditions that can cause numbness/tingling in the hand include compression conditions in the forearm, elbow, shoulder, or neck, fibromyalgia, myofascial pain syndrome and peripheral neuropathy. Non-Surgical Treatment    The main goal of treatment is to reduce or remove the causes of increased nerve pressure. This should result in a decrease in symptoms. Some non-surgical treatment options may include:    Oral anti-inflammatory medicine    Steroid injection (cortisone shot)    Wrist splint(s)    Oral medications and injections are more effective when symptoms are present for a short period of time, infrequent and mild. Wrist splinting, mainly at night, keeps the wrist out of a bent position. Wrist splints are most helpful with symptoms that are affected by the hand or wrist position. Splints are also more helpful when the symptoms are mild and when symptoms have been present for a shorter period of time. However, splints have been shown to improve, but not cure symptoms, even when carpal tunnel is severe. It can also be useful to limit activities that bring on numbness and tingling. Surgical Treatment    Surgical release of the carpal tunnel ligament is one of the most effective treatments. It takes the extra pressure off the nerve immediately and reliably. There are several different surgical techniques to cut the transverse carpal ligament. By opening the ligament, there is more room for the tendons and the nerve to pass through the tunnel without pressure. After carpal tunnel release surgery, your surgeon may recommend temporarily avoiding certain activities. Moving the fingers right away and frequently after a surgical procedure helps to limit stiffness, swelling, and adhesions. Adhesions are areas of scar tissue that can form and link the nerve to the tendons it rests on. If adhesions form, the moving tendon will pull on the nerve and may cause symptoms.  Early, gentle finger and wrist motion can help avoid adhesions and encourage tendons and nerves to move separately. The surgical scar and surrounding area may go through some mild changes in color, firmness and tenderness over the first several months. These changes are normal. Most scars wont change much after four months. You may work with a hand therapist, who will give instructions on exercises and scar massage to get your hand function back to normal. Returning to work after carpal tunnel surgery is dependent upon each persons symptoms, job demands and employer policies. In patients with less physically demanding jobs, they will be able to return to work in a few days, where other jobs may take weeks to safely return. Your surgeon and therapist will discuss the best recovery plan with you. After surgery, most patients have very little pain at the incision site. Numerous scientific studies have shown surgical pain can be well-controlled with acetaminophen, non-steroidal anti-inflammatory drugs (NSAIDs) (like Ibuprofen), ice, elevation, early gentle finger and wrist motion, and limiting activity to things that are comfortable. Very rarely a patient may require a few doses of a stronger opioid pain medication within the first 2-3 days. It is very important to limit this type of strong pain medication to decrease the chance of becoming dependent on the medicine, having a tolerance build up, or having withdrawal symptoms when trying to stop it. Multiple medical organizations and state licensing boards have established guidelines and policies on safe prescribing of pain medication. Please discuss any concerns with your surgeon prior to any surgery to make sure everyone is on the same page and expectations can be clearly set and understood. Outcomes After Surgery    Better results will occur when carpal tunnel is recognized and treated earlier. This means seeking treatment when your symptoms are not severe, and they come and go.  There will likely be complete return of feeling and muscle use after surgery. Relief may even be immediate. However, if the symptoms are severe and constant when you seek treatment, the final result may be unknown. Your healing may be slow (six or more months). If there was permanent damage before surgery, you will likely still feel numbness. Unfortunately, there is no test to tell if the symptoms are reversible. If you lost use of your thumb muscle, that will likely persist, at least to some degree.

## 2022-02-11 NOTE — PROGRESS NOTES
Chief Complaint   Patient presents with    Hand Pain     tingling/pain in bilateral arms with typing/video game, went to [facility] on Wednesday

## 2022-02-11 NOTE — PROGRESS NOTES
Kate Sarah (: 1995) is a 32 y.o. male patient here for evaluation of the following chief complaint(s):  Hand Pain (tingling/pain in bilateral arms with typing/video game, went to [facility] on Wednesday)       ASSESSMENT/PLAN:  Below is the assessment and plan developed based on review of pertinent history, physical exam, labs, studies, and medications. 1. Bilateral carpal tunnel syndrome  -     meloxicam (Mobic) 15 mg tablet; Take 1 Tablet by mouth daily for 30 days. Take with food, Normal, Disp-30 Tablet, R-[de-identified]     30-year-old male with signs of carpal tunnel syndrome and median nerve irritation. This is only been going on for a couple of weeks. We discussed activity modification as well as anti-inflammatory medication which a prescription was sent for. He will follow up in 3 to 4 weeks if no better. If no better would likely consider a nerve conduction study to further evaluate this problem. Follow-up and Dispositions    · Return in about 4 weeks (around 3/11/2022), or if symptoms worsen or fail to improve. Patient verbalized understanding and elected to proceed. All questions were answered to the patient's apparent satisfaction. SUBJECTIVE/OBJECTIVE:  HPI  30-year-old male with 3-week history of tingling, weakness in the bilateral hands as well as pain that is intermittent. He states that this began when he was doing a lot of typing searching for a job. He was also playing a lot of video games and both of these activities made it worse. His been feeling better as he has been resting it. Patient reports a sudden onset of symptoms. Duration of problem 3 weeks. Symptom Severity 3/10  Symptom Frequency intermittent        No Known Allergies    Current Outpatient Medications   Medication Sig    meloxicam (Mobic) 15 mg tablet Take 1 Tablet by mouth daily for 30 days.  Take with food    ibuprofen (MOTRIN) 600 mg tablet Take 600 mg by mouth every six (6) hours as needed for Pain. (Patient not taking: Reported on 2/11/2022)    senna (Senna) 8.6 mg tablet Take 1 Tablet by mouth daily as needed for Constipation. (Patient not taking: Reported on 2/11/2022)     No current facility-administered medications for this visit. Social History     Socioeconomic History    Marital status: SINGLE     Spouse name: Not on file    Number of children: Not on file    Years of education: Not on file    Highest education level: Not on file   Occupational History    Not on file   Tobacco Use    Smoking status: Never Smoker    Smokeless tobacco: Current User   Vaping Use    Vaping Use: Every day    Substances: Nicotine, THC, Flavoring    Devices: Pre-filled or refillable cartridge, Refillable tank   Substance and Sexual Activity    Alcohol use: Not Currently     Comment: used to drink 2-4 shots, a couple times a week    Drug use: Yes     Types: Marijuana     Comment: 2x week    Sexual activity: Not on file   Other Topics Concern    Not on file   Social History Narrative    Not on file     Social Determinants of Health     Financial Resource Strain:     Difficulty of Paying Living Expenses: Not on file   Food Insecurity:     Worried About Running Out of Food in the Last Year: Not on file    Sorin of Food in the Last Year: Not on file   Transportation Needs:     Lack of Transportation (Medical): Not on file    Lack of Transportation (Non-Medical):  Not on file   Physical Activity:     Days of Exercise per Week: Not on file    Minutes of Exercise per Session: Not on file   Stress:     Feeling of Stress : Not on file   Social Connections:     Frequency of Communication with Friends and Family: Not on file    Frequency of Social Gatherings with Friends and Family: Not on file    Attends [employer] Services: Not on file    Active Member of Clubs or Organizations: Not on file    Attends Club or Organization Meetings: Not on file    Marital Status: Not on file   Intimate Partner Violence:     Fear of Current or Ex-Partner: Not on file    Emotionally Abused: Not on file    Physically Abused: Not on file    Sexually Abused: Not on file   Housing Stability:     Unable to Pay for Housing in the Last Year: Not on file    Number of [place] in the Last Year: Not on file    Unstable Housing in the Last Year: Not on file       Past Surgical History:   Procedure Laterality Date    HX HERNIA REPAIR  01/11/2022    s/p Laparoscopic repair of right inguinal hernia with mesh, robot assisted and Umbilical hernia repair     HX TYMPANOSTOMY      HX WISDOM TEETH EXTRACTION         Family History   Problem Relation Age of Onset    Cancer Maternal Grandmother     Anemia Mother     Irregular heart beat Mother     Heart Failure Brother             Review of Systems    No flowsheet data found. Vitals:  Ht 5' 9\" (1.753 m)   Wt 200 lb (90.7 kg)   BMI 29.53 kg/m²    Estimated body surface area is 2.1 meters squared as calculated from the following:    Height as of this encounter: 5' 9\" (1.753 m). Weight as of this encounter: 200 lb (90.7 kg). Body mass index is 29.53 kg/m². Physical Exam    Musculoskeletal Exam:    Bilateral NEURO EXAM:    MNCT: Positive, elicits some pain, no radiating numbness or tingling    Thenar Atrophy: none    Tinel's MN: Positive    APB STRENGTH: 5/5    1st DI Strength: 5/5        Patient fires AIN, PIN and ulnar nerves. Sensation is grossly intact in the median, radial and ulnar distribution. Hand is pink and appears well-perfused. Hand is warm. Skin is intact.       Consitutional: Healthy  Skin:   - Edema -none   - Cellulitis - No    Neuro: Numbness or tingling in R/L arm: Yes    Psych: Affect normal    Cardiovascular: Capillary Refill < 2 seconds in upper extremities    Respiratory: Non-Labored Breathing    ROS:    Constitutional: Denies fever/chills    Respiratory: Denies SOB        Orders Placed This Encounter    meloxicam (Mobic) 15 mg tablet     Sig: Take 1 Tablet by mouth daily for 30 days. Take with food     Dispense:  30 Tablet     Refill:  0            An electronic signature was used to authenticate this note.   -- Chiara Parks MD

## 2022-03-08 ENCOUNTER — APPOINTMENT (OUTPATIENT)
Dept: GENERAL RADIOLOGY | Age: 27
End: 2022-03-08
Attending: PHYSICIAN ASSISTANT
Payer: MEDICAID

## 2022-03-08 ENCOUNTER — HOSPITAL ENCOUNTER (EMERGENCY)
Age: 27
Discharge: HOME | End: 2022-03-08
Attending: EMERGENCY MEDICINE
Payer: MEDICAID

## 2022-03-08 VITALS
WEIGHT: 199.96 LBS | DIASTOLIC BLOOD PRESSURE: 74 MMHG | OXYGEN SATURATION: 100 % | HEIGHT: 69 IN | RESPIRATION RATE: 16 BRPM | SYSTOLIC BLOOD PRESSURE: 127 MMHG | TEMPERATURE: 98.2 F | HEART RATE: 78 BPM | BODY MASS INDEX: 29.62 KG/M2

## 2022-03-08 DIAGNOSIS — M79.601 RIGHT ARM PAIN: Primary | ICD-10-CM

## 2022-03-08 PROCEDURE — 73060 X-RAY EXAM OF HUMERUS: CPT

## 2022-03-08 PROCEDURE — 99283 EMERGENCY DEPT VISIT LOW MDM: CPT

## 2022-03-08 RX ORDER — MELOXICAM 15 MG/1
15 TABLET ORAL DAILY
Qty: 14 TABLET | Refills: 0 | Status: SHIPPED | OUTPATIENT
Start: 2022-03-08 | End: 2022-03-22

## 2022-03-08 NOTE — ED PROVIDER NOTES
EMERGENCY DEPARTMENT HISTORY AND PHYSICAL EXAM      Date: 3/8/2022  Patient Name: Jean Pierre Michelle    History of Presenting Illness     Chief Complaint   Patient presents with    Arm Pain     pt arrives to ED via EMS with a cc of right arm pain x 1 month but states the pain has gotten worse over the past couple of days        History Provided By: Patient    HPI: Jean Pierre Michelle, 32 y.o. RHD male without reported PMHx presents BIBA to the ED with cc of right upper arm pain x1 month. The patient describes the pain as \"discomfort\" and states that \"it feels like my arm is coming apart, like a rope unraveling. \"  This has been ongoing for 1 month. 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 The day after he worked the discomfort was worse, particularly over the R bicep muscle. Pt reports needing to support the R arm with his left arm for comfort. The patient reports paresthesias of the right hand. [facility] [address] problem several weeks ago. He does not know what he was diagnosed with. He was prescribed meloxicam, which he has been taking. He denies trauma or injury, fevers, rashes, weakness, decreased range of motion, neck pain, numbness. There are no other complaints, changes, or physical findings at this time. PCP: None    No current facility-administered medications on file prior to encounter. Current Outpatient Medications on File Prior to Encounter   Medication Sig Dispense Refill    meloxicam (Mobic) 15 mg tablet Take 1 Tablet by mouth daily for 30 days. Take with food 30 Tablet 0    ibuprofen (MOTRIN) 600 mg tablet Take 600 mg by mouth every six (6) hours as needed for Pain. (Patient not taking: Reported on 2/11/2022)      senna (Senna) 8.6 mg tablet Take 1 Tablet by mouth daily as needed for Constipation.  (Patient not taking: Reported on 2/11/2022)         Past History     Past Medical History:  Past Medical History: Diagnosis Date    Anxiety     Depression     GERD (gastroesophageal reflux disease)     Seizure ([facility] [address] 03/2019    x 1, did not complete F/U with Dr. India Minor       Past Surgical History:  Past Surgical History:   Procedure Laterality Date    HX HERNIA REPAIR  01/11/2022    s/p Laparoscopic repair of right inguinal hernia with mesh, robot assisted and Umbilical hernia repair     HX TYMPANOSTOMY      HX WISDOM TEETH EXTRACTION         Family History:  Family History   Problem Relation Age of Onset    Cancer Maternal Grandmother     Anemia Mother     Irregular heart beat Mother     Heart Failure Brother        Social History:  Social History     Tobacco Use    Smoking status: Never Smoker    Smokeless tobacco: Current User   Vaping Use    Vaping Use: Every day    Substances: Nicotine, THC, Flavoring    Devices: Pre-filled or refillable cartridge, Refillable tank   Substance Use Topics    Alcohol use: Not Currently     Comment: used to drink 2-4 shots, a couple times a week    Drug use: Yes     Types: Marijuana     Comment: 2x week       Allergies:  No Known Allergies      Review of Systems   Review of Systems   Constitutional: Negative for fever. Musculoskeletal: Positive for myalgias (R bicep). Negative for neck pain and neck stiffness. Skin: Negative for rash. Neurological:        Paresthesias R hand       Physical Exam   Physical Exam  Vitals and nursing note reviewed. Constitutional:       General: He is not in acute distress. Appearance: Normal appearance. He is not toxic-appearing. HENT:      Head: Normocephalic and atraumatic. Nose: Nose normal.   Eyes:      Extraocular Movements: Extraocular movements intact. Conjunctiva/sclera: Conjunctivae normal.   Neck:      Trachea: Phonation normal.      Comments: No midline tenderness. Cardiovascular:      Rate and Rhythm: Normal rate and regular rhythm.       Pulses:           Radial pulses are 2+ on the right side and 2+ on the left side. Pulmonary:      Effort: Pulmonary effort is normal.      Breath sounds: Normal breath sounds. Abdominal:      Palpations: Abdomen is soft. Tenderness: There is no abdominal tenderness. Musculoskeletal:         General: Normal range of motion. Cervical back: Normal range of motion and neck supple. Comments: FROM and 5/5 strength b/l UEs. Sensation to light touch intact throughout. Pt reports paresthesias of the R hand. No erythema, edema, ecchymosis, or tenderness of the R upper extremity. Gait is steady and symmetrical.     Skin:     General: Skin is warm and dry. Neurological:      General: No focal deficit present. Mental Status: He is alert and oriented to person, place, and time. GCS: GCS eye subscore is 4. GCS verbal subscore is 5. GCS motor subscore is 6. Psychiatric:         Mood and Affect: Mood normal.         Behavior: Behavior normal.         Diagnostic Study Results     Labs -   No results found for this or any previous visit (from the past 12 hour(s)). Radiologic Studies -   XR HUMERUS RT   Final Result   No acute abnormality. CT Results  (Last 48 hours)    None        CXR Results  (Last 48 hours)    None            Medical Decision Making   I am the first provider for this patient. I reviewed the vital signs, available nursing notes, past medical history, past surgical history, family history and social history. Vital Signs-Reviewed the patient's vital signs. Patient Vitals for the past 12 hrs:   Temp Pulse Resp BP SpO2   03/08/22 1440 98.2 °F (36.8 °C) 78 16 127/74 100 %       Records Reviewed: Nursing Notes and Old Medical Records    Provider Notes (Medical Decision Making): On chart review it appears the orthopedist he saw about 1 month ago diagnosed him with bilateral carpal tunnel syndrome.   While he does report paresthesias of the right hand, today he is primarily complaining of discomfort in the upper bicep, which I do not feel is related to this carpal tunnel diagnosis. He has full strength and no areas of tenderness, edema, or ecchymosis on the hand. Sensation to light touch is intact, though he reports paresthesias of the hand. X-ray obtained of the humerus as precaution, results negative/normal. Discussed bicep muscle strain vs sprain given recent lifting at work, which pt does not typically do. Advised on RICE tx, NSAIDs. Patient is agreeable discharge with outpatient Ortho follow-up. ED return precautions reviewed. ED Course:   Initial assessment performed. The patients presenting problems have been discussed, and they are in agreement with the care plan formulated and outlined with them. I have encouraged them to ask questions as they arise throughout their visit. Critical Care Time: None    Disposition:  D/c    PLAN:  1. Discharge Medication List as of 3/8/2022  4:21 PM      START taking these medications    Details   !! meloxicam (MOBIC) 15 mg tablet Take 1 Tablet by mouth daily for 14 days. , Normal, Disp-14 Tablet, R-0       !! - Potential duplicate medications found. Please discuss with provider. CONTINUE these medications which have NOT CHANGED    Details   !! meloxicam (Mobic) 15 mg tablet Take 1 Tablet by mouth daily for 30 days. Take with food, Normal, Disp-30 Tablet, R-0      ibuprofen (MOTRIN) 600 mg tablet Take 600 mg by mouth every six (6) hours as needed for Pain., Historical Med      senna (Senna) 8.6 mg tablet Take 1 Tablet by mouth daily as needed for Constipation. , Historical Med       !! - Potential duplicate medications found. Please discuss with provider.         2.   Follow-up Information     Follow up With Specialties Details Why Contact Info    [facility] EMERGENCY DEPT Emergency Medicine  As needed, If symptoms worsen [address] [facility] [address]    Farshad Phillips MD Orthopedic Surgery Call  For follow up [address] [facility] [address]  [phone]          Return to ED if worse     Diagnosis     Clinical Impression:   1. Right arm pain          Please note that this dictation was completed with Differential, the computer voice recognition software. Quite often unanticipated grammatical, syntax, homophones, and other interpretive errors are inadvertently transcribed by the computer software. Please disregards these errors. Please excuse any errors that have escaped final proofreading.

## 2022-03-08 NOTE — ED NOTES
DC plan reviewed with FRANCIS Boston. DC papers reviewed and in hand, pt verbalized understanding. Ambulatory out of ER without assist, no acute distress noted.

## 2022-03-08 NOTE — Clinical Note
[address]  [facility] EMERGENCY DEPT  [address] [place] [address]  Natalia Yan [address]  [phone]    Work/School Note    Date: 3/8/2022    To Whom It May concern:    Sean Prajapati was seen and treated today in the emergency room by the following provider(s):  Attending Provider: Mary Jo Johnson MD  Physician Assistant: Camacho Snyder. Sean Prajapati is excused from work/school on 03/08/22 and 03/09/22. He is medically clear to return to work/school on 3/10/2022.        Sincerely,          Camacho Smalls

## 2022-03-10 ENCOUNTER — HOSPITAL ENCOUNTER (EMERGENCY)
Age: 27
Discharge: HOME | End: 2022-03-10
Attending: STUDENT IN AN ORGANIZED HEALTH CARE EDUCATION/TRAINING PROGRAM
Payer: MEDICAID

## 2022-03-10 ENCOUNTER — APPOINTMENT (OUTPATIENT)
Dept: ULTRASOUND IMAGING | Age: 27
End: 2022-03-10
Attending: STUDENT IN AN ORGANIZED HEALTH CARE EDUCATION/TRAINING PROGRAM
Payer: MEDICAID

## 2022-03-10 VITALS
OXYGEN SATURATION: 100 % | RESPIRATION RATE: 18 BRPM | SYSTOLIC BLOOD PRESSURE: 132 MMHG | BODY MASS INDEX: 30.01 KG/M2 | DIASTOLIC BLOOD PRESSURE: 91 MMHG | HEIGHT: 69 IN | TEMPERATURE: 98.6 F | HEART RATE: 71 BPM | WEIGHT: 202.6 LBS

## 2022-03-10 DIAGNOSIS — N50.812 PAIN IN LEFT TESTICLE: Primary | ICD-10-CM

## 2022-03-10 PROCEDURE — 99284 EMERGENCY DEPT VISIT MOD MDM: CPT

## 2022-03-10 PROCEDURE — 76870 US EXAM SCROTUM: CPT

## 2022-03-10 NOTE — ED PROVIDER NOTES
EMERGENCY DEPARTMENT HISTORY AND PHYSICAL EXAM      Date: 3/10/2022  Patient Name: Josefa Dueñas    History of Presenting Illness     Chief Complaint   Patient presents with    Groin Pain     Patient reports of left tesitcular pain which started around 3 am today. Was seen at urgent care and sent over for possible testicular tortion         HPI: Josefa Dueñas, 32 y.o. male presents to the ED with cc of left testicular discomfort. He noticed this around 3:30 in the morning. He says that it feels a little strange in his left testicle, no severe pain. He denies any associated nausea, vomiting or fevers. No dysuria or hematuria. No penile discharge, no recent sexual contacts, no concern for STDs. No trauma to the area. There are no other complaints, changes, or physical findings at this time. PCP: None    No current facility-administered medications on file prior to encounter. Current Outpatient Medications on File Prior to Encounter   Medication Sig Dispense Refill    meloxicam (MOBIC) 15 mg tablet Take 1 Tablet by mouth daily for 14 days. 14 Tablet 0    meloxicam (Mobic) 15 mg tablet Take 1 Tablet by mouth daily for 30 days. Take with food 30 Tablet 0    ibuprofen (MOTRIN) 600 mg tablet Take 600 mg by mouth every six (6) hours as needed for Pain. (Patient not taking: Reported on 2/11/2022)      senna (Senna) 8.6 mg tablet Take 1 Tablet by mouth daily as needed for Constipation.  (Patient not taking: Reported on 2/11/2022)         Past History     Past Medical History:  Past Medical History:   Diagnosis Date    Anxiety     Depression     GERD (gastroesophageal reflux disease)     Seizure ([facility] [address] 03/2019    x 1, did not complete F/U with Dr. Chacorta Fair       Past Surgical History:  Past Surgical History:   Procedure Laterality Date    HX HERNIA REPAIR  01/11/2022    s/p Laparoscopic repair of right inguinal hernia with mesh, robot assisted and Umbilical hernia repair     HX TYMPANOSTOMY  HX WISDOM TEETH EXTRACTION         Family History:  Family History   Problem Relation Age of Onset    Cancer Maternal Grandmother     Anemia Mother     Irregular heart beat Mother     Heart Failure Brother        Social History:  Social History     Tobacco Use    Smoking status: Never Smoker    Smokeless tobacco: Current User   Vaping Use    Vaping Use: Every day    Substances: Nicotine, THC, Flavoring    Devices: Pre-filled or refillable cartridge, Refillable tank   Substance Use Topics    Alcohol use: Not Currently     Comment: used to drink 2-4 shots, a couple times a week    Drug use: Yes     Types: Marijuana     Comment: 2x week       Allergies:  No Known Allergies      Review of Systems   no fever  No ear pain  No eye pain  no shortness of breath  no chest pain  no abdominal pain  no dysuria  no leg pain  No rash  No lymphadenopathy  No weight loss    Physical Exam   Physical Exam  Constitutional:       General: He is not in acute distress. Appearance: He is not toxic-appearing. HENT:      Head: Normocephalic and atraumatic. Mouth/Throat:      Mouth: Mucous membranes are moist.   Eyes:      Extraocular Movements: Extraocular movements intact. Cardiovascular:      Rate and Rhythm: Normal rate and regular rhythm. Pulmonary:      Effort: Pulmonary effort is normal.      Breath sounds: Normal breath sounds. Abdominal:      Palpations: Abdomen is soft. Tenderness: There is no abdominal tenderness. Genitourinary:     Penis: Normal.       Comments: Very mild tenderness of the left testy without any significant swelling, no epididymal tenderness, no erythema, both testes are mobile. No inguinal masses  Musculoskeletal:         General: No deformity. Cervical back: Neck supple. Skin:     General: Skin is warm and dry. Neurological:      General: No focal deficit present. Mental Status: He is alert and oriented to person, place, and time.    Psychiatric:         Mood and Affect: Mood normal.         Diagnostic Study Results     Labs -   No results found for this or any previous visit (from the past 24 hour(s)). Radiologic Studies -   US SCROTUM/TESTICLES   Final Result      Normal testicles   Small anechoic right hydrocele   Bilateral epididymal cysts        CT Results  (Last 48 hours)    None        CXR Results  (Last 48 hours)    None            Medical Decision Making   I am the first provider for this patient. I reviewed the vital signs, available nursing notes, past medical history, past surgical history, family history and social history. Vital Signs-Reviewed the patient's vital signs. Patient Vitals for the past 24 hrs:   Temp Pulse Resp BP SpO2   03/10/22 1410 98.6 °F (37 °C) 71 18 (!) 132/91 100 %         Provider Notes (Medical Decision Making):   80-year-old male presenting with left testicle discomfort. Differential includes possible epididymitis, orchitis, less likely torsion, possible testicular mass. No signs of infection on exam.  No dysuria or hematuria, unlikely cystitis, no risk factors for this. No discharge or sexual contacts, unlikely STD. ED Course:     Initial assessment performed. The patients presenting problems have been discussed, and they are in agreement with the care plan formulated and outlined with them. I have encouraged them to ask questions as they arise throughout their visit. Ultrasound shows normal testicles, small hydrocele and epididymal cyst.  Patient informed of these findings. He is resting comfortably on reevaluation. Patient is counseled on supportive care and return precautions. Will return to the ED for any worsening pain, swelling, fevers, burning with urination, or any new or worrisome symptoms. Will followup with urology within 5 days. Critical Care Time:         Disposition:  Home    PLAN:  1. Discharge Medication List as of 3/10/2022  4:33 PM        2.    Follow-up Information     Follow up With Specialties Details Why [address] [facility]  Schedule an appointment as soon as possible for a visit in 1 week  [address] [facility] [address] [facility] DEPT Emergency Medicine  As needed, If symptoms worsen [address]  [address]  [phone]        Return to ED if worse     Diagnosis     Clinical Impression: Acute left testicular

## 2022-03-11 ENCOUNTER — HOSPITAL ENCOUNTER (EMERGENCY)
Age: 27
Discharge: HOME | End: 2022-03-11
Attending: EMERGENCY MEDICINE
Payer: MEDICAID

## 2022-03-11 VITALS
TEMPERATURE: 98.7 F | RESPIRATION RATE: 20 BRPM | SYSTOLIC BLOOD PRESSURE: 123 MMHG | OXYGEN SATURATION: 97 % | WEIGHT: 205 LBS | BODY MASS INDEX: 30.36 KG/M2 | DIASTOLIC BLOOD PRESSURE: 76 MMHG | HEIGHT: 69 IN | HEART RATE: 116 BPM

## 2022-03-11 DIAGNOSIS — M79.601 MUSCULOSKELETAL ARM PAIN, RIGHT: Primary | ICD-10-CM

## 2022-03-11 LAB
ALBUMIN SERPL-MCNC: 4.2 G/DL (ref 3.5–5)
ALBUMIN/GLOB SERPL: 0.9 {RATIO} (ref 1.1–2.2)
ALP SERPL-CCNC: 60 U/L (ref 45–117)
ALT SERPL-CCNC: 44 U/L (ref 12–78)
ANION GAP SERPL CALC-SCNC: 4 MMOL/L (ref 5–15)
AST SERPL-CCNC: 23 U/L (ref 15–37)
ATRIAL RATE: 87 BPM
BASOPHILS # BLD: 0 K/UL (ref 0–0.1)
BASOPHILS NFR BLD: 0 % (ref 0–1)
BILIRUB SERPL-MCNC: 0.7 MG/DL (ref 0.2–1)
BUN SERPL-MCNC: 8 MG/DL (ref 6–20)
BUN/CREAT SERPL: 8 (ref 12–20)
CALCIUM SERPL-MCNC: 9.7 MG/DL (ref 8.5–10.1)
CALCULATED P AXIS, ECG09: 62 DEGREES
CALCULATED R AXIS, ECG10: 62 DEGREES
CALCULATED T AXIS, ECG11: 46 DEGREES
CHLORIDE SERPL-SCNC: 106 MMOL/L (ref 97–108)
CK SERPL-CCNC: 327 U/L (ref 39–308)
CO2 SERPL-SCNC: 26 MMOL/L (ref 21–32)
CREAT SERPL-MCNC: 1.02 MG/DL (ref 0.7–1.3)
DIAGNOSIS, 93000: NORMAL
DIFFERENTIAL METHOD BLD: ABNORMAL
EOSINOPHIL # BLD: 0 K/UL (ref 0–0.4)
EOSINOPHIL NFR BLD: 0 % (ref 0–7)
ERYTHROCYTE [DISTWIDTH] IN BLOOD BY AUTOMATED COUNT: 13.3 % (ref 11.5–14.5)
GLOBULIN SER CALC-MCNC: 4.6 G/DL (ref 2–4)
GLUCOSE SERPL-MCNC: 95 MG/DL (ref 65–100)
HCT VFR BLD AUTO: 43.5 % (ref 36.6–50.3)
HGB BLD-MCNC: 15.2 G/DL (ref 12.1–17)
IMM GRANULOCYTES # BLD AUTO: 0 K/UL (ref 0–0.04)
IMM GRANULOCYTES NFR BLD AUTO: 0 % (ref 0–0.5)
LYMPHOCYTES # BLD: 0.6 K/UL (ref 0.8–3.5)
LYMPHOCYTES NFR BLD: 8 % (ref 12–49)
MAGNESIUM SERPL-MCNC: 2.4 MG/DL (ref 1.6–2.4)
MCH RBC QN AUTO: 27.7 PG (ref 26–34)
MCHC RBC AUTO-ENTMCNC: 34.9 G/DL (ref 30–36.5)
MCV RBC AUTO: 79.2 FL (ref 80–99)
MONOCYTES # BLD: 0.4 K/UL (ref 0–1)
MONOCYTES NFR BLD: 5 % (ref 5–13)
NEUTS SEG # BLD: 7.1 K/UL (ref 1.8–8)
NEUTS SEG NFR BLD: 87 % (ref 32–75)
NRBC # BLD: 0 K/UL (ref 0–0.01)
NRBC BLD-RTO: 0 PER 100 WBC
P-R INTERVAL, ECG05: 150 MS
PLATELET # BLD AUTO: 344 K/UL (ref 150–400)
PMV BLD AUTO: 8.5 FL (ref 8.9–12.9)
POTASSIUM SERPL-SCNC: 4.3 MMOL/L (ref 3.5–5.1)
PROT SERPL-MCNC: 8.8 G/DL (ref 6.4–8.2)
Q-T INTERVAL, ECG07: 324 MS
QRS DURATION, ECG06: 84 MS
QTC CALCULATION (BEZET), ECG08: 389 MS
RBC # BLD AUTO: 5.49 M/UL (ref 4.1–5.7)
RBC MORPH BLD: ABNORMAL
SODIUM SERPL-SCNC: 136 MMOL/L (ref 136–145)
VENTRICULAR RATE, ECG03: 87 BPM
WBC # BLD AUTO: 8.1 K/UL (ref 4.1–11.1)

## 2022-03-11 PROCEDURE — 96374 THER/PROPH/DIAG INJ IV PUSH: CPT

## 2022-03-11 PROCEDURE — 99284 EMERGENCY DEPT VISIT MOD MDM: CPT

## 2022-03-11 PROCEDURE — 74011250636 HC RX REV CODE- 250/636: Performed by: EMERGENCY MEDICINE

## 2022-03-11 PROCEDURE — 36415 COLL VENOUS BLD VENIPUNCTURE: CPT

## 2022-03-11 PROCEDURE — 80053 COMPREHEN METABOLIC PANEL: CPT

## 2022-03-11 PROCEDURE — 74011250637 HC RX REV CODE- 250/637: Performed by: EMERGENCY MEDICINE

## 2022-03-11 PROCEDURE — 83735 ASSAY OF MAGNESIUM: CPT

## 2022-03-11 PROCEDURE — 82550 ASSAY OF CK (CPK): CPT

## 2022-03-11 PROCEDURE — 93005 ELECTROCARDIOGRAM TRACING: CPT

## 2022-03-11 PROCEDURE — 85025 COMPLETE CBC W/AUTO DIFF WBC: CPT

## 2022-03-11 RX ORDER — METHOCARBAMOL 750 MG/1
750 TABLET, FILM COATED ORAL
Qty: 20 TABLET | Refills: 0 | Status: SHIPPED | OUTPATIENT
Start: 2022-03-11

## 2022-03-11 RX ORDER — LORAZEPAM 2 MG/ML
1 INJECTION INTRAMUSCULAR
Status: COMPLETED | OUTPATIENT
Start: 2022-03-11 | End: 2022-03-11

## 2022-03-11 RX ORDER — ACETAMINOPHEN 500 MG
1000 TABLET ORAL
Status: COMPLETED | OUTPATIENT
Start: 2022-03-11 | End: 2022-03-11

## 2022-03-11 RX ORDER — IBUPROFEN 400 MG/1
800 TABLET ORAL
Status: COMPLETED | OUTPATIENT
Start: 2022-03-11 | End: 2022-03-11

## 2022-03-11 RX ORDER — IBUPROFEN 600 MG/1
600 TABLET ORAL
Qty: 20 TABLET | Refills: 0 | OUTPATIENT
Start: 2022-03-11 | End: 2022-10-04

## 2022-03-11 RX ORDER — LIDOCAINE 4 G/100G
PATCH TOPICAL
Qty: 10 PATCH | Refills: 0 | Status: SHIPPED | OUTPATIENT
Start: 2022-03-11

## 2022-03-11 RX ADMIN — IBUPROFEN 800 MG: 400 TABLET, FILM COATED ORAL at 13:18

## 2022-03-11 RX ADMIN — LORAZEPAM 1 MG: 2 INJECTION INTRAMUSCULAR; INTRAVENOUS at 13:17

## 2022-03-11 RX ADMIN — SODIUM CHLORIDE 1000 ML: 9 INJECTION, SOLUTION INTRAVENOUS at 13:23

## 2022-03-11 RX ADMIN — ACETAMINOPHEN 1000 MG: 500 TABLET ORAL at 13:18

## 2022-03-11 NOTE — DISCHARGE INSTRUCTIONS
You were evaluated in the emergency department for right arm pain. Your examination was reassuring as was your work-up including blood work and EKG. It will be important for you to follow-up with your primary care physician in 2-3 days. If you develop worsening symptoms such as chest pain or shortness of breath, please return to the emergency department immediately. You can use Tylenol 1000 mg every 6 hours as needed for pain, please do not exceed 4000 mg in a single day. You can use ibuprofen 600 mg every 6 hours as needed for pain, please do not exceed 2400 milligrams in a single day. You can use lidocaine patches (over-the-counter) in the affected area every 12 hours as needed for pain. Please use heat pads and stretching to help alleviate the pain.

## 2022-03-11 NOTE — ED PROVIDER NOTES
EMERGENCY DEPARTMENT HISTORY AND PHYSICAL EXAM      Date: 3/11/2022  Patient Name: Kate Sarah    History of Presenting Illness     Chief Complaint   Patient presents with    Generalized Body Aches     Pt arrives via wheelchair to triage with CC of \"by body is falling apart, i can feel the tendons and fibers falling apart\" x 1 hour. Patient reports he has \"mastrubated excessively\" over the past few weeks and he believes that this is why he's experiencing symptoms. History Provided By: Patient    HPI: Kate Sarah, 32 y.o. male with history of seizures, anxiety, depression presents to the ED with cc of generalized body aches. Patient states \"my body is falling apart\". He complains of pain located throughout all of his arms, his neck, his back, and his legs. Pain feels like his tendons are ripping apart and feels like \"guitar strings are snapping inside my body\". He states he has had the symptoms for a few months but it got much worse this past morning. He states that he was masturbating when symptoms onset and he believes that symptoms are related. He does endorse daily marijuana use and smoked marijuana earlier today. Denies any other drug use or alcohol use. Denies any fevers, chills, chest pain but he does endorse some shortness of breath. Denies any nausea, vomiting, diarrhea, abdominal pain, or recent illness. He does have history of anxiety but states he has never had an anxiety attack this badly. There are no other complaints, changes, or physical findings at this time. PCP: None    No current facility-administered medications on file prior to encounter. Current Outpatient Medications on File Prior to Encounter   Medication Sig Dispense Refill    meloxicam (MOBIC) 15 mg tablet Take 1 Tablet by mouth daily for 14 days. 14 Tablet 0    meloxicam (Mobic) 15 mg tablet Take 1 Tablet by mouth daily for 30 days.  Take with food 30 Tablet 0    [DISCONTINUED] ibuprofen (MOTRIN) 600 mg tablet Take 600 mg by mouth every six (6) hours as needed for Pain. (Patient not taking: Reported on 2/11/2022)      senna (Senna) 8.6 mg tablet Take 1 Tablet by mouth daily as needed for Constipation. (Patient not taking: Reported on 2/11/2022)         Past History     Past Medical History:  Past Medical History:   Diagnosis Date    Anxiety     Depression     GERD (gastroesophageal reflux disease)     Seizure ([facility] [address] 03/2019    x 1, did not complete F/U with Dr. Joselito Sandra       Past Surgical History:  Past Surgical History:   Procedure Laterality Date    HX HERNIA REPAIR  01/11/2022    s/p Laparoscopic repair of right inguinal hernia with mesh, robot assisted and Umbilical hernia repair     HX TYMPANOSTOMY      HX WISDOM TEETH EXTRACTION         Family History:  Family History   Problem Relation Age of Onset    Cancer Maternal Grandmother     Anemia Mother     Irregular heart beat Mother     Heart Failure Brother        Social History:  Social History     Tobacco Use    Smoking status: Never Smoker    Smokeless tobacco: Current User   Vaping Use    Vaping Use: Every day    Substances: Nicotine, THC, Flavoring    Devices: Pre-filled or refillable cartridge, Refillable tank   Substance Use Topics    Alcohol use: Not Currently     Comment: used to drink 2-4 shots, a couple times a week    Drug use: Yes     Types: Marijuana     Comment: 2x week       Allergies:  No Known Allergies      Review of Systems   Review of Systems   Constitutional: Negative for chills and fever. Eyes: Negative for visual disturbance. Respiratory: Negative for cough. Cardiovascular: Negative for chest pain and leg swelling. Gastrointestinal: Positive for nausea. Negative for abdominal pain and vomiting. Musculoskeletal: Positive for myalgias. Negative for back pain and gait problem. Skin: Negative for color change and rash. Neurological: Negative for dizziness, weakness, light-headedness and headaches. Psychiatric/Behavioral: The patient is nervous/anxious. All other systems reviewed and are negative. Physical Exam   Physical Exam  Vitals and nursing note reviewed. Constitutional:       General: He is not in acute distress. Appearance: Normal appearance. He is not ill-appearing or toxic-appearing. Comments: Appears very anxious, lying in bed holding arms across his body. HENT:      Head: Normocephalic and atraumatic. Nose: Nose normal.      Mouth/Throat:      Mouth: Mucous membranes are moist.   Eyes:      Extraocular Movements: Extraocular movements intact. Pupils: Pupils are equal, round, and reactive to light. Cardiovascular:      Rate and Rhythm: Normal rate and regular rhythm. Heart sounds: No murmur heard. Pulmonary:      Effort: Pulmonary effort is normal. No respiratory distress. Breath sounds: Normal breath sounds. No wheezing. Abdominal:      General: There is no distension. Palpations: Abdomen is soft. Tenderness: There is no abdominal tenderness. There is no guarding or rebound. Musculoskeletal:         General: No swelling or tenderness. Normal range of motion. Cervical back: Normal range of motion and neck supple. Right lower leg: No edema. Left lower leg: No edema. Comments: Full active and passive range of motion throughout all extremities. Patient yells out in pain with manipulation of his right arm but he has full range of motion both active and passive and there is no swelling, warmth, erythema, or reproducible bony TTP. Skin:     General: Skin is warm and dry. Coloration: Skin is not pale. Findings: No erythema. Neurological:      General: No focal deficit present. Mental Status: He is alert and oriented to person, place, and time. Psychiatric:      Comments: Appears very anxious.          Diagnostic Study Results     Labs -     Recent Results (from the past 12 hour(s))   EKG, 12 LEAD, INITIAL    Collection Time: 03/11/22  1:00 PM   Result Value Ref Range    Ventricular Rate 87 BPM    Atrial Rate 87 BPM    P-R Interval 150 ms    QRS Duration 84 ms    Q-T Interval 324 ms    QTC Calculation (Bezet) 389 ms    Calculated P Axis 62 degrees    Calculated R Axis 62 degrees    Calculated T Axis 46 degrees    Diagnosis       Normal sinus rhythm with sinus arrhythmia  Normal ECG  When compared with ECG of 14-APR-2020 07:48,  No significant change was found     CBC WITH AUTOMATED DIFF    Collection Time: 03/11/22  1:06 PM   Result Value Ref Range    WBC 8.1 4.1 - 11.1 K/uL    RBC 5.49 4. 10 - 5.70 M/uL    HGB 15.2 12.1 - 17.0 g/dL    HCT 43.5 36.6 - 50.3 %    MCV 79.2 (L) 80.0 - 99.0 FL    MCH 27.7 26.0 - 34.0 PG    MCHC 34.9 30.0 - 36.5 g/dL    RDW 13.3 11.5 - 14.5 %    PLATELET 225 980 - 281 K/uL    MPV 8.5 (L) 8.9 - 12.9 FL    NRBC 0.0 0  WBC    ABSOLUTE NRBC 0.00 0.00 - 0.01 K/uL    NEUTROPHILS 87 (H) 32 - 75 %    LYMPHOCYTES 8 (L) 12 - 49 %    MONOCYTES 5 5 - 13 %    EOSINOPHILS 0 0 - 7 %    BASOPHILS 0 0 - 1 %    IMMATURE GRANULOCYTES 0 0.0 - 0.5 %    ABS. NEUTROPHILS 7.1 1.8 - 8.0 K/UL    ABS. LYMPHOCYTES 0.6 (L) 0.8 - 3.5 K/UL    ABS. MONOCYTES 0.4 0.0 - 1.0 K/UL    ABS. EOSINOPHILS 0.0 0.0 - 0.4 K/UL    ABS. BASOPHILS 0.0 0.0 - 0.1 K/UL    ABS. IMM.  GRANS. 0.0 0.00 - 0.04 K/UL    DF SMEAR SCANNED      RBC COMMENTS NORMOCYTIC, NORMOCHROMIC     METABOLIC PANEL, COMPREHENSIVE    Collection Time: 03/11/22  1:06 PM   Result Value Ref Range    Sodium 136 136 - 145 mmol/L    Potassium 4.3 3.5 - 5.1 mmol/L    Chloride 106 97 - 108 mmol/L    CO2 26 21 - 32 mmol/L    Anion gap 4 (L) 5 - 15 mmol/L    Glucose 95 65 - 100 mg/dL    BUN 8 6 - 20 MG/DL    Creatinine 1.02 0.70 - 1.30 MG/DL    BUN/Creatinine ratio 8 (L) 12 - 20      GFR est AA >60 >60 ml/min/1.73m2    GFR est non-AA >60 >60 ml/min/1.73m2    Calcium 9.7 8.5 - 10.1 MG/DL    Bilirubin, total 0.7 0.2 - 1.0 MG/DL    ALT (SGPT) 44 12 - 78 U/L    AST (SGOT) 23 15 - 37 U/L    Alk. phosphatase 60 45 - 117 U/L    Protein, total 8.8 (H) 6.4 - 8.2 g/dL    Albumin 4.2 3.5 - 5.0 g/dL    Globulin 4.6 (H) 2.0 - 4.0 g/dL    A-G Ratio 0.9 (L) 1.1 - 2.2     CK    Collection Time: 03/11/22  1:06 PM   Result Value Ref Range     (H) 39 - 308 U/L   MAGNESIUM    Collection Time: 03/11/22  1:06 PM   Result Value Ref Range    Magnesium 2.4 1.6 - 2.4 mg/dL       Radiologic Studies -   No orders to display     CT Results  (Last 48 hours)    None        CXR Results  (Last 48 hours)    None          Medical Decision Making   I am the first provider for this patient. I reviewed the vital signs, available nursing notes, past medical history, past surgical history, family history and social history. Vital Signs-Reviewed the patient's vital signs. Patient Vitals for the past 12 hrs:   Temp Pulse Resp BP SpO2   03/11/22 1324 -- -- -- 123/76 97 %   03/11/22 1035 98.7 °F (37.1 °C) (!) 116 20 (!) 153/84 100 %       Records Reviewed: Nursing Notes    Provider Notes (Medical Decision Making):   63-year-old male presenting with generalized myalgias. He appears very anxious and does admit to smoking marijuana prior to arrival.  Afebrile and vital signs stable. Exam as above. Differential diagnosis includes electrolyte abnormality including hypokalemia, hypomagnesemia, dehydration, rhabdomyolysis, anxiety, marijuana intoxication. We will treat with IV fluids, Tylenol, Toradol, and Ativan and reassess. ED Course:   Initial assessment performed. The patients presenting problems have been discussed, and they are in agreement with the care plan formulated and outlined with them. I have encouraged them to ask questions as they arise throughout their visit. ED Course as of 03/11/22 1502   Fri Mar 11, 2022   1302 EKG per my interpretation normal sinus rhythm, rate 87 bpm, normal axis, no acute ischemic changes or interval changes.  [AK]   1400 Patient feels improved on reexamination. Feels better after IV fluids, Ativan, Toradol, and Tylenol. Pain is reproduced with range of motion although he does have full range of motion and continues to deny any chest pain or shortness of breath. No anginal symptoms. Doubt PE  Or dissection. Encouraged follow-up with PCP, encourage cessation of marijuana, patient given strict return precautions and all questions answered. [AK]      ED Course User Index  [AK] Varun Urban MD     Discharge Note:  The patient has been re-evaluated and is ready for discharge. Reviewed available results with patient. Counseled patient on diagnosis and care plan. Patient has expressed understanding, and all questions have been answered. Patient agrees with plan and agrees to follow up as recommended, or to return to the ED if their symptoms worsen. Discharge instructions have been provided and explained to the patient, along with reasons to return to the ED. Disposition:  Discharge    DISCHARGE PLAN:  1. Discharge Medication List as of 3/11/2022  2:13 PM      START taking these medications    Details   methocarbamoL (ROBAXIN) 750 mg tablet Take 1 Tablet by mouth four (4) times daily as needed for Muscle Spasm(s). , Normal, Disp-20 Tablet, R-0      lidocaine 4 % patch Apply every 12 hours as needed for pain., Normal, Disp-10 Patch, R-0      ibuprofen (MOTRIN) 600 mg tablet Take 1 Tablet by mouth every six (6) hours as needed for Pain., Normal, Disp-20 Tablet, R-0         CONTINUE these medications which have NOT CHANGED    Details   !! meloxicam (MOBIC) 15 mg tablet Take 1 Tablet by mouth daily for 14 days. , Normal, Disp-14 Tablet, R-0      !! meloxicam (Mobic) 15 mg tablet Take 1 Tablet by mouth daily for 30 days. Take with food, Normal, Disp-30 Tablet, R-0      senna (Senna) 8.6 mg tablet Take 1 Tablet by mouth daily as needed for Constipation. , Historical Med       !! - Potential duplicate medications found. Please discuss with provider.         2. Follow-up Information     Follow up With Specialties Details Why [id number] Internal Medicine Schedule an appointment as soon as possible for a visit  to establish with a PCP Sariah Cornelius[address] [facility] [address] [id number] [phone]    hospitals EMERGENCY DEPT Emergency Medicine Go to  As needed, If symptoms worsen [address]  [address] [facility]  [phone]        3. Return to ED if worse     Diagnosis     Clinical Impression:   1. Musculoskeletal arm pain, right        Attestations:  I am the first and primary provider of record for this patient's ED encounter. I personally performed the services described above in this documentation. Molly Lay MD    Please note that this dictation was completed with [employer], the computer voice recognition software. Quite often unanticipated grammatical, syntax, homophones, and other interpretive errors are inadvertently transcribed by the computer software. Please disregard these errors. Please excuse any errors that have escaped final proofreading. Thank you.

## 2022-03-11 NOTE — Clinical Note
[address]  [facility] EMERGENCY DEPT  [address] [place] [address]  Natalia Yan [id number] [phone]    Work/School Note    Date: 3/11/2022    To Whom It May concern:    Sean Prajapati was seen and treated today in the emergency room by the following provider(s):  Attending Provider: Varun Urban MD.      Sean Prajaapti is excused from work/school on 3/11/2022 through 3/13/2022. He is medically clear to return to work/school on 3/14/2022.          Sincerely,          Betsy Bentley MD

## 2022-03-14 ENCOUNTER — OFFICE VISIT (OUTPATIENT)
Dept: ORTHOPEDIC SURGERY | Age: 27
End: 2022-03-14
Payer: MEDICAID

## 2022-03-14 VITALS — WEIGHT: 205 LBS | BODY MASS INDEX: 30.36 KG/M2 | HEIGHT: 69 IN

## 2022-03-14 DIAGNOSIS — R20.2 ARM PARESTHESIA, RIGHT: Primary | ICD-10-CM

## 2022-03-14 DIAGNOSIS — R20.2 ARM PARESTHESIA, LEFT: ICD-10-CM

## 2022-03-14 PROCEDURE — 99213 OFFICE O/P EST LOW 20 MIN: CPT | Performed by: ORTHOPAEDIC SURGERY

## 2022-03-14 NOTE — PROGRESS NOTES
Nolan Bledsoe (: 1995) is a 32 y.o. male patient here for evaluation of the following chief complaint(s):  Arm Pain (Right Arm , pain has been raidiating throughtout the body from the arm on the right side sense last visit and the maloxicam does seem to help)       ASSESSMENT/PLAN:  Below is the assessment and plan developed based on review of pertinent history, physical exam, labs, studies, and medications. 1. Arm paresthesia, right  2. Arm paresthesia, left      61-year-old male who previously had  signs of carpal tunnel syndrome and median nerve irritation. He improved significantly with the meloxicam.  He also has had significant issues with anxiety and panic attacks associated with masturbation and smoking marijuana. I counseled him regarding overactivity and avoiding marijuana use to help curb the symptoms. He does feel like the symptoms are associated with those 2 things. He is going to try to avoid both of them to see if his symptoms resolve. We discussed if symptoms persist could obtain a nerve conduction study. If you would like we can order that for him and I can call him with results. I do not feel I have anything surgically to offer him or any other interventions and I did recommend that he follow-up with his primary care physician. Follow-up and Dispositions    · Return if symptoms worsen or fail to improve. Patient verbalized understanding and elected to proceed. All questions were answered to the patient's apparent satisfaction. SUBJECTIVE/OBJECTIVE:  HPI  61-year-old male following up for his bilateral upper extremity pain, numbness and tingling. He recently went to the emergency department last Friday instead of following up in the office as he was having significant pain and symptoms of numbness radiating up his arm and into his face. He was fully worked up in the emergency department.   Patient states he was masturbating excessively up to 9 times a day and having severe pain associated with this. He is also smoking marijuana he states and having severe anxiety and panic attacks. His symptoms have since improved. His work-up was reassuring per the patient in the emergency department. Patient reports a sudden onset of symptoms. Duration of problem 6 weeks. Symptom Severity 3/10  Symptom Frequency intermittent        No Known Allergies    Current Outpatient Medications   Medication Sig    methocarbamoL (ROBAXIN) 750 mg tablet Take 1 Tablet by mouth four (4) times daily as needed for Muscle Spasm(s).  lidocaine 4 % patch Apply every 12 hours as needed for pain.  ibuprofen (MOTRIN) 600 mg tablet Take 1 Tablet by mouth every six (6) hours as needed for Pain.  meloxicam (MOBIC) 15 mg tablet Take 1 Tablet by mouth daily for 14 days.  senna (Senna) 8.6 mg tablet Take 1 Tablet by mouth daily as needed for Constipation. (Patient not taking: Reported on 2/11/2022)     No current facility-administered medications for this visit.        Social History     Socioeconomic History    Marital status: SINGLE     Spouse name: Not on file    Number of children: Not on file    Years of education: Not on file    Highest education level: Not on file   Occupational History    Not on file   Tobacco Use    Smoking status: Never Smoker    Smokeless tobacco: Current User   Vaping Use    Vaping Use: Every day    Substances: Nicotine, THC, Flavoring    Devices: Pre-filled or refillable cartridge, Refillable tank   Substance and Sexual Activity    Alcohol use: Not Currently     Comment: used to drink 2-4 shots, a couple times a week    Drug use: Yes     Types: Marijuana     Comment: 2x week    Sexual activity: Not on file   Other Topics Concern    Not on file   Social History Narrative    Not on file     Social Determinants of Health     Financial Resource Strain:     Difficulty of Paying Living Expenses: Not on file   Food Insecurity:     Worried About Running Out of Food in the Last Year: Not on file    Ran Out of Food in the Last Year: Not on file   Transportation Needs:     Lack of Transportation (Medical): Not on file    Lack of Transportation (Non-Medical): Not on file   Physical Activity:     Days of Exercise per Week: Not on file    Minutes of Exercise per Session: Not on file   Stress:     Feeling of Stress : Not on file   Social Connections:     Frequency of Communication with Friends and Family: Not on file    Frequency of Social Gatherings with Friends and Family: Not on file    Attends Evangelical Services: Not on file    Active Member of [address] [place] [address] [employer] or Organizations: Not on file    Attends Club or Organization Meetings: Not on file    Marital Status: Not on file   Intimate Partner Violence:     Fear of Current or Ex-Partner: Not on file    Emotionally Abused: Not on file    Physically Abused: Not on file    Sexually Abused: Not on file   Housing Stability:     Unable to Pay for Housing in the Last Year: Not on file    Number of [place] in the Last Year: Not on file    Unstable Housing in the Last Year: Not on file       Past Surgical History:   Procedure Laterality Date    HX HERNIA REPAIR  01/11/2022    s/p Laparoscopic repair of right inguinal hernia with mesh, robot assisted and Umbilical hernia repair     HX TYMPANOSTOMY      HX WISDOM TEETH EXTRACTION         Family History   Problem Relation Age of Onset    Cancer Maternal Grandmother     Anemia Mother     Irregular heart beat Mother     Heart Failure Brother             Review of Systems    No flowsheet data found. Vitals:  Ht 5' 9\" (1.753 m)   Wt 205 lb (93 kg)   BMI 30.27 kg/m²    Estimated body surface area is 2.13 meters squared as calculated from the following:    Height as of this encounter: 5' 9\" (1.753 m). Weight as of this encounter: 205 lb (93 kg). Body mass index is 30.27 kg/m².        Physical Exam    Musculoskeletal Exam:    Bilateral NEURO EXAM:    MNCT: Positive, elicits some pain, no radiating numbness or tingling    Thenar Atrophy: none    Tinel's MN: Positive    APB STRENGTH: 5/5    1st DI Strength: 5/5        Patient fires AIN, PIN and ulnar nerves. Sensation is grossly intact in the median, radial and ulnar distribution. Hand is pink and appears well-perfused. Hand is warm. Skin is intact. Consitutional: Healthy  Skin:   - Edema -none   - Cellulitis - No    Neuro: Numbness or tingling in R/L arm: Yes    Psych: Affect normal    Cardiovascular: Capillary Refill < 2 seconds in upper extremities    Respiratory: Non-Labored Breathing    ROS:    Constitutional: Denies fever/chills    Respiratory: Denies SOB        No orders of the defined types were placed in this encounter. An electronic signature was used to authenticate this note.   -- Clarke Ramos MD

## 2022-10-04 ENCOUNTER — HOSPITAL ENCOUNTER (EMERGENCY)
Age: 27
Discharge: HOME | End: 2022-10-04
Attending: STUDENT IN AN ORGANIZED HEALTH CARE EDUCATION/TRAINING PROGRAM
Payer: MEDICAID

## 2022-10-04 ENCOUNTER — APPOINTMENT (OUTPATIENT)
Dept: ULTRASOUND IMAGING | Age: 27
End: 2022-10-04
Attending: PHYSICIAN ASSISTANT
Payer: MEDICAID

## 2022-10-04 VITALS
TEMPERATURE: 98.1 F | WEIGHT: 200 LBS | RESPIRATION RATE: 16 BRPM | HEIGHT: 69 IN | HEART RATE: 83 BPM | OXYGEN SATURATION: 99 % | DIASTOLIC BLOOD PRESSURE: 78 MMHG | SYSTOLIC BLOOD PRESSURE: 116 MMHG | BODY MASS INDEX: 29.62 KG/M2

## 2022-10-04 DIAGNOSIS — R10.2 PELVIC PAIN: Primary | ICD-10-CM

## 2022-10-04 LAB
APPEARANCE UR: CLEAR
BACTERIA URNS QL MICRO: NEGATIVE /HPF
BILIRUB UR QL: NEGATIVE
COLOR UR: ABNORMAL
EPITH CASTS URNS QL MICRO: ABNORMAL /LPF
GLUCOSE UR STRIP.AUTO-MCNC: NEGATIVE MG/DL
HGB UR QL STRIP: NEGATIVE
HYALINE CASTS URNS QL MICRO: ABNORMAL /LPF (ref 0–2)
KETONES UR QL STRIP.AUTO: ABNORMAL MG/DL
LEUKOCYTE ESTERASE UR QL STRIP.AUTO: NEGATIVE
NITRITE UR QL STRIP.AUTO: NEGATIVE
PH UR STRIP: 5.5 [PH] (ref 5–8)
PROT UR STRIP-MCNC: NEGATIVE MG/DL
RBC #/AREA URNS HPF: ABNORMAL /HPF (ref 0–5)
SP GR UR REFRACTOMETRY: 1.02
UA: UC IF INDICATED,UAUC: ABNORMAL
UROBILINOGEN UR QL STRIP.AUTO: 1 EU/DL (ref 0.2–1)
WBC URNS QL MICRO: ABNORMAL /HPF (ref 0–4)

## 2022-10-04 PROCEDURE — 81001 URINALYSIS AUTO W/SCOPE: CPT

## 2022-10-04 PROCEDURE — 99284 EMERGENCY DEPT VISIT MOD MDM: CPT

## 2022-10-04 PROCEDURE — 76870 US EXAM SCROTUM: CPT

## 2022-10-04 RX ORDER — ACETAMINOPHEN 325 MG/1
650 TABLET ORAL EVERY 6 HOURS
Qty: 40 TABLET | Refills: 0 | Status: SHIPPED | OUTPATIENT
Start: 2022-10-04 | End: 2022-10-09

## 2022-10-04 RX ORDER — IBUPROFEN 800 MG/1
800 TABLET ORAL
Qty: 30 TABLET | Refills: 0 | Status: SHIPPED | OUTPATIENT
Start: 2022-10-04 | End: 2022-10-14

## 2022-10-04 NOTE — Clinical Note
[address]  [facility] EMERGENCY DEPT  [address] [facility]  Christy Grullon [id number] [phone]    Work/School Note    Date: 10/4/2022    To Whom It May concern:    Miranda Roa was seen and treated today in the emergency room by the following provider(s):  Attending Provider: Roberth Palomares MD  Physician Assistant: FRANCIS Carroll. Miranda Roa is excused from work/school on 10/4/2022 through 10/6/2022. He is medically clear to return to work/school on 10/7/2022.          Sincerely,          FRANCIS Ahuja

## 2022-10-04 NOTE — DISCHARGE INSTRUCTIONS
As we discussed, though your ultrasound did not show signs of testicular torsion (twisting of the testicle), this does not 100% rule out this cause of your pain. You should return to the emergency department immediately for any recurrence of severe pain, testicular pain/swelling, vomiting, or any other new concerning symptoms. Follow-up with your primary care doctor the next available appointment for reevaluation of your symptoms.

## 2022-10-04 NOTE — ED PROVIDER NOTES
EMERGENCY DEPARTMENT HISTORY AND PHYSICAL EXAM      Date: 10/4/2022  Patient Name: Steve Bains    History of Presenting Illness     Chief Complaint   Patient presents with    Groin Pain     Testicular/groin pain. , bilateral, right greater than left, x 2 days, constant for the past 2 hours. He lifted something heavy at work 2 days ago. He is not sure if this is the cause of the pain, but he states it feels like a previous hernia. Pain has improved during EMS transport       History Provided By: Patient    HPI: Steve Bains, 32 y.o. male with no past medical history who presents ED with pelvic pain. He states he was lifting boxes off shelves a few days ago at work at [employer] and began having generalized discomfort in his pelvic region, which feels similar to a hernia he was diagnosed with last year. Patient was unable to articulate what type of hernia he had, but denies have any surgery or interventional procedures for this. Last evening he began having a sharp pain generalized in his pelvic area radiating to his back. It is worse right and left, patient points to the right inguinal crease. He feels like there is \"swelling \"in his generalized pelvic area. When asked about testicular pain patient paused for a few moments and stated \"I would have to say yes\". he started having severe pain when standing up earlier today, but improved when sitting down. This prompted him to call EMS. He states his pain is 1/10 in the ED he is not have any dysuria, hematuria, rash, fevers, abdominal pain,  vomiting, diarrhea, bloody stools, penile discharge. Patient reports constipation the last bowel movement was yesterday. There are no other complaints, changes, or physical findings at this time. PCP: None    No current facility-administered medications on file prior to encounter.      Current Outpatient Medications on File Prior to Encounter   Medication Sig Dispense Refill    methocarbamoL (ROBAXIN) 750 mg tablet Take 1 Tablet by mouth four (4) times daily as needed for Muscle Spasm(s). 20 Tablet 0    lidocaine 4 % patch Apply every 12 hours as needed for pain. 10 Patch 0    [DISCONTINUED] ibuprofen (MOTRIN) 600 mg tablet Take 1 Tablet by mouth every six (6) hours as needed for Pain. 20 Tablet 0    senna (Senna) 8.6 mg tablet Take 1 Tablet by mouth daily as needed for Constipation. (Patient not taking: Reported on 2/11/2022)         Past History     Past Medical History:  Past Medical History:   Diagnosis Date    Anxiety     Depression     GERD (gastroesophageal reflux disease)     Seizure ([facility] [address] 03/2019    x 1, did not complete F/U with Dr. Sylvia Chambers       Past Surgical History:  Past Surgical History:   Procedure Laterality Date    HX HERNIA REPAIR  01/11/2022    s/p Laparoscopic repair of right inguinal hernia with mesh, robot assisted and Umbilical hernia repair     HX TYMPANOSTOMY      HX WISDOM TEETH EXTRACTION         Family History:  Family History   Problem Relation Age of Onset    Cancer Maternal Grandmother     Anemia Mother     Irregular heart beat Mother     Heart Failure Brother        Social History:  Social History     Tobacco Use    Smoking status: Never    Smokeless tobacco: Current     Last attempt to quit: 1/4/2022   Vaping Use    Vaping Use: Every day    Substances: Nicotine, THC, Flavoring    Devices: Pre-filled or refillable cartridge, Refillable tank   Substance Use Topics    Alcohol use: Not Currently     Comment: used to drink 2-4 shots, a couple times a week    Drug use: Yes     Types: Marijuana     Comment: 2x week       Allergies:  No Known Allergies      Review of Systems   Review of Systems   Constitutional:  Negative for chills and fever. HENT:  Negative for congestion and sore throat. Respiratory:  Negative for cough and shortness of breath. Cardiovascular:  Negative for chest pain. Gastrointestinal:  Negative for abdominal pain, diarrhea, nausea and vomiting.    Genitourinary: Positive for testicular pain. Negative for dysuria, hematuria and scrotal swelling. Musculoskeletal:  Negative for myalgias. Skin:  Negative for rash. Neurological:  Negative for headaches. All other systems reviewed and are negative. Physical Exam   Physical Exam  Vitals and nursing note reviewed. Exam conducted with a chaperone present. Constitutional:       General: He is not in acute distress. Appearance: He is not toxic-appearing. Comments: Patient well-appearing, nontoxic. Resting comfortably in stretcher with no signs of pain or distress   HENT:      Head: Atraumatic. Right Ear: External ear normal.      Left Ear: External ear normal.      Nose: Nose normal.      Mouth/Throat:      Mouth: Mucous membranes are moist.   Eyes:      Extraocular Movements: Extraocular movements intact. Conjunctiva/sclera: Conjunctivae normal.   Cardiovascular:      Rate and Rhythm: Normal rate and regular rhythm. Pulses: Normal pulses. Heart sounds: Normal heart sounds. No murmur heard. No friction rub. No gallop. Pulmonary:      Effort: Pulmonary effort is normal. No respiratory distress. Breath sounds: Normal breath sounds. No stridor. No wheezing, rhonchi or rales. Abdominal:      General: Abdomen is flat. There is no distension. Palpations: Abdomen is soft. Tenderness: There is no abdominal tenderness. There is no guarding or rebound. Comments: Abdomen soft, nontender   Genitourinary:     Comments: Inguinal, pelvic and genitourinary area normal to inspection with no swelling erythema or lesions. No palpable pain over inguinal canals. Testicles normal to inspection with no swelling or erythema. No testicular pain or nodules. Positive cremasteric reflex  Musculoskeletal:         General: No swelling. Cervical back: Neck supple. Skin:     General: Skin is warm. Neurological:      Mental Status: He is alert and oriented to person, place, and time. Psychiatric:         Mood and Affect: Mood normal.         Behavior: Behavior normal.         Thought Content: Thought content normal.         Judgment: Judgment normal.       Diagnostic Study Results     Labs -     Recent Results (from the past 12 hour(s))   URINALYSIS W/ REFLEX CULTURE    Collection Time: 10/04/22  4:19 PM    Specimen: Urine   Result Value Ref Range    Color YELLOW/STRAW      Appearance CLEAR CLEAR      Specific gravity 1.022      pH (UA) 5.5 5.0 - 8.0      Protein Negative NEG mg/dL    Glucose Negative NEG mg/dL    Ketone TRACE (A) NEG mg/dL    Bilirubin Negative NEG      Blood Negative NEG      Urobilinogen 1.0 0.2 - 1.0 EU/dL    Nitrites Negative NEG      Leukocyte Esterase Negative NEG      UA:UC IF INDICATED CULTURE NOT INDICATED BY UA RESULT CNI      WBC 0-4 0 - 4 /hpf    RBC 0-5 0 - 5 /hpf    Epithelial cells FEW FEW /lpf    Bacteria Negative NEG /hpf    Hyaline cast 0-2 0 - 2 /lpf       Radiologic Studies -   US SCROTUM/TESTICLES   Final Result   Small left epididymal cyst otherwise unremarkable. CT Results  (Last 48 hours)      None          CXR Results  (Last 48 hours)      None              Medical Decision Making   I am the first provider for this patient. I reviewed the vital signs, available nursing notes, past medical history, past surgical history, family history and social history. Vital Signs-Reviewed the patient's vital signs. Patient Vitals for the past 12 hrs:   Temp Pulse Resp BP SpO2   10/04/22 1431 98.1 °F (36.7 °C) 83 16 116/78 99 %       Records Reviewed: Nursing Notes    Provider Notes (Medical Decision Making):   I completed a structured, evidence-based clinical evaluation to screen for testicular torsion. The patient has a reassuring clinical exam and the Ultrasound is negative for torsion. The evidence indicates that the patient is very low risk for a testicular  torsion and this is consistent with my clinical intuition.  The risk of further workup or hospitalization is likely higher than the likelihood of the patient having a torsed testicle or other dangerous scrotal condition. It is, therefore, in the patients best  interest to not do additional emergent testing or remain in the ED with its attendant risks. Urinalysis was unremarkable for signs of infection and he had no testicular findings on exam.  Low concern for epididymitis or urethritis. I suspect patient's pain is musculoskeletal in nature given his clinical presentation. Patient was advised on the diagnostic concern to the ultrasound and he should return to the ED for signs of testicular torsion. He was advised on follow-up with his PCP for reevaluation of his symptoms. Patient expressed understanding agreement the discharge instructions and treatment plan    ED Course:   Initial assessment performed. The patients presenting problems have been discussed, and they are in agreement with the care plan formulated and outlined with them. I have encouraged them to ask questions as they arise throughout their visit. Critical Care Time: None    Disposition:  discharged    PLAN:  1. Discharge Medication List as of 10/4/2022  5:15 PM        START taking these medications    Details   ibuprofen (MOTRIN) 800 mg tablet Take 1 Tablet by mouth three (3) times daily (with meals) for 10 days. , Normal, Disp-30 Tablet, R-0      acetaminophen (TYLENOL) 325 mg tablet Take 2 Tablets by mouth every six (6) hours for 5 days. , Normal, Disp-40 Tablet, R-0           CONTINUE these medications which have NOT CHANGED    Details   methocarbamoL (ROBAXIN) 750 mg tablet Take 1 Tablet by mouth four (4) times daily as needed for Muscle Spasm(s). , Normal, Disp-20 Tablet, R-0      lidocaine 4 % patch Apply every 12 hours as needed for pain., Normal, Disp-10 Patch, R-0      senna (Senna) 8.6 mg tablet Take 1 Tablet by mouth daily as needed for Constipation. , Historical Med           2.    Follow-up Information Follow up With Specialties Details [address] [facility] Schedule an appointment as soon as possible for a visit in 1 week  [facility] [address] [facility] EMERGENCY DEPT Emergency Medicine  If symptoms worsen [address]  [address] [facility]  [phone]          Return to ED if worse     Diagnosis     Clinical Impression:   1. Pelvic pain          Please note that this dictation was completed with [employer], the computer voice recognition software. Quite often unanticipated grammatical, syntax, homophones, and other interpretive errors are inadvertently transcribed by the computer software. Please disregards these errors. Please excuse any errors that have escaped final proofreading.

## 2023-12-05 ENCOUNTER — HOSPITAL ENCOUNTER (INPATIENT)
Facility: HOSPITAL | Age: 28
LOS: 8 days | Discharge: HOME | DRG: 750 | End: 2023-12-13
Attending: PSYCHIATRY & NEUROLOGY | Admitting: PSYCHIATRY & NEUROLOGY
Payer: MEDICAID

## 2023-12-05 DIAGNOSIS — R45.851 SUICIDAL THOUGHTS: ICD-10-CM

## 2023-12-05 DIAGNOSIS — F20.9 SCHIZOPHRENIA, UNSPECIFIED TYPE (HCC): Primary | ICD-10-CM

## 2023-12-05 LAB
ALBUMIN SERPL-MCNC: 4.5 G/DL (ref 3.5–5)
ALBUMIN/GLOB SERPL: 1 (ref 1.1–2.2)
ALP SERPL-CCNC: 77 U/L (ref 45–117)
ALT SERPL-CCNC: 95 U/L (ref 12–78)
AMPHET UR QL SCN: NEGATIVE
ANION GAP SERPL CALC-SCNC: 13 MMOL/L (ref 5–15)
APPEARANCE UR: CLEAR
AST SERPL-CCNC: 86 U/L (ref 15–37)
BACTERIA URNS QL MICRO: NEGATIVE /HPF
BARBITURATES UR QL SCN: NEGATIVE
BASOPHILS # BLD: 0 K/UL (ref 0–0.1)
BASOPHILS NFR BLD: 1 % (ref 0–1)
BENZODIAZ UR QL: NEGATIVE
BILIRUB SERPL-MCNC: 1.2 MG/DL (ref 0.2–1)
BILIRUB UR QL: NEGATIVE
BUN SERPL-MCNC: 9 MG/DL (ref 6–20)
BUN/CREAT SERPL: 8 (ref 12–20)
CALCIUM SERPL-MCNC: 9.5 MG/DL (ref 8.5–10.1)
CANNABINOIDS UR QL SCN: POSITIVE
CHLORIDE SERPL-SCNC: 101 MMOL/L (ref 97–108)
CO2 SERPL-SCNC: 26 MMOL/L (ref 21–32)
COCAINE UR QL SCN: NEGATIVE
COLOR UR: ABNORMAL
CREAT SERPL-MCNC: 1.13 MG/DL (ref 0.7–1.3)
DIFFERENTIAL METHOD BLD: ABNORMAL
EOSINOPHIL # BLD: 0 K/UL (ref 0–0.4)
EOSINOPHIL NFR BLD: 0 % (ref 0–7)
EPITH CASTS URNS QL MICRO: ABNORMAL /LPF
ERYTHROCYTE [DISTWIDTH] IN BLOOD BY AUTOMATED COUNT: 14.1 % (ref 11.5–14.5)
ETHANOL SERPL-MCNC: <10 MG/DL (ref 0–0.08)
FLUAV RNA SPEC QL NAA+PROBE: NOT DETECTED
FLUBV RNA SPEC QL NAA+PROBE: NOT DETECTED
GLOBULIN SER CALC-MCNC: 4.5 G/DL (ref 2–4)
GLUCOSE SERPL-MCNC: 96 MG/DL (ref 65–100)
GLUCOSE UR STRIP.AUTO-MCNC: NEGATIVE MG/DL
HCT VFR BLD AUTO: 44.4 % (ref 36.6–50.3)
HGB BLD-MCNC: 15 G/DL (ref 12.1–17)
HGB UR QL STRIP: NEGATIVE
IMM GRANULOCYTES # BLD AUTO: 0 K/UL (ref 0–0.04)
IMM GRANULOCYTES NFR BLD AUTO: 0 % (ref 0–0.5)
KETONES UR QL STRIP.AUTO: ABNORMAL MG/DL
LEUKOCYTE ESTERASE UR QL STRIP.AUTO: NEGATIVE
LYMPHOCYTES # BLD: 1.2 K/UL (ref 0.8–3.5)
LYMPHOCYTES NFR BLD: 18 % (ref 12–49)
Lab: ABNORMAL
MCH RBC QN AUTO: 27.7 PG (ref 26–34)
MCHC RBC AUTO-ENTMCNC: 33.8 G/DL (ref 30–36.5)
MCV RBC AUTO: 82.1 FL (ref 80–99)
METHADONE UR QL: NEGATIVE
MONOCYTES # BLD: 0.6 K/UL (ref 0–1)
MONOCYTES NFR BLD: 8 % (ref 5–13)
NEUTS SEG # BLD: 5.1 K/UL (ref 1.8–8)
NEUTS SEG NFR BLD: 73 % (ref 32–75)
NITRITE UR QL STRIP.AUTO: NEGATIVE
NRBC # BLD: 0 K/UL (ref 0–0.01)
NRBC BLD-RTO: 0 PER 100 WBC
OPIATES UR QL: NEGATIVE
PCP UR QL: NEGATIVE
PH UR STRIP: 5.5 (ref 5–8)
PLATELET # BLD AUTO: 341 K/UL (ref 150–400)
PMV BLD AUTO: 8.7 FL (ref 8.9–12.9)
POTASSIUM SERPL-SCNC: 3.6 MMOL/L (ref 3.5–5.1)
PROT SERPL-MCNC: 9 G/DL (ref 6.4–8.2)
PROT UR STRIP-MCNC: ABNORMAL MG/DL
RBC # BLD AUTO: 5.41 M/UL (ref 4.1–5.7)
RBC #/AREA URNS HPF: ABNORMAL /HPF (ref 0–5)
SARS-COV-2 RNA RESP QL NAA+PROBE: NOT DETECTED
SODIUM SERPL-SCNC: 140 MMOL/L (ref 136–145)
SP GR UR REFRACTOMETRY: 1.03 (ref 1–1.03)
URINE CULTURE IF INDICATED: ABNORMAL
UROBILINOGEN UR QL STRIP.AUTO: 1 EU/DL (ref 0.2–1)
WBC # BLD AUTO: 6.9 K/UL (ref 4.1–11.1)
WBC URNS QL MICRO: ABNORMAL /HPF (ref 0–4)

## 2023-12-05 PROCEDURE — 80307 DRUG TEST PRSMV CHEM ANLYZR: CPT

## 2023-12-05 PROCEDURE — 6370000000 HC RX 637 (ALT 250 FOR IP): Performed by: PSYCHIATRY & NEUROLOGY

## 2023-12-05 PROCEDURE — 82077 ASSAY SPEC XCP UR&BREATH IA: CPT

## 2023-12-05 PROCEDURE — 36415 COLL VENOUS BLD VENIPUNCTURE: CPT

## 2023-12-05 PROCEDURE — 81001 URINALYSIS AUTO W/SCOPE: CPT

## 2023-12-05 PROCEDURE — 90791 PSYCH DIAGNOSTIC EVALUATION: CPT

## 2023-12-05 PROCEDURE — 1240000000 HC EMOTIONAL WELLNESS R&B

## 2023-12-05 PROCEDURE — 85025 COMPLETE CBC W/AUTO DIFF WBC: CPT

## 2023-12-05 PROCEDURE — 80053 COMPREHEN METABOLIC PANEL: CPT

## 2023-12-05 PROCEDURE — 87636 SARSCOV2 & INF A&B AMP PRB: CPT

## 2023-12-05 PROCEDURE — 6370000000 HC RX 637 (ALT 250 FOR IP): Performed by: PHYSICIAN ASSISTANT

## 2023-12-05 PROCEDURE — 99285 EMERGENCY DEPT VISIT HI MDM: CPT

## 2023-12-05 RX ORDER — POLYETHYLENE GLYCOL 3350 17 G/17G
17 POWDER, FOR SOLUTION ORAL DAILY PRN
Status: DISCONTINUED | OUTPATIENT
Start: 2023-12-05 | End: 2023-12-13 | Stop reason: HOSPADM

## 2023-12-05 RX ORDER — HYDROXYZINE HYDROCHLORIDE 25 MG/1
50 TABLET, FILM COATED ORAL 3 TIMES DAILY PRN
Status: DISCONTINUED | OUTPATIENT
Start: 2023-12-05 | End: 2023-12-13 | Stop reason: HOSPADM

## 2023-12-05 RX ORDER — MAGNESIUM HYDROXIDE/ALUMINUM HYDROXICE/SIMETHICONE 120; 1200; 1200 MG/30ML; MG/30ML; MG/30ML
30 SUSPENSION ORAL EVERY 6 HOURS PRN
Status: DISCONTINUED | OUTPATIENT
Start: 2023-12-05 | End: 2023-12-13 | Stop reason: HOSPADM

## 2023-12-05 RX ORDER — HALOPERIDOL 5 MG/1
5 TABLET ORAL EVERY 4 HOURS PRN
Status: DISCONTINUED | OUTPATIENT
Start: 2023-12-05 | End: 2023-12-13 | Stop reason: HOSPADM

## 2023-12-05 RX ORDER — HALOPERIDOL 5 MG/ML
5 INJECTION INTRAMUSCULAR EVERY 4 HOURS PRN
Status: DISCONTINUED | OUTPATIENT
Start: 2023-12-05 | End: 2023-12-13 | Stop reason: HOSPADM

## 2023-12-05 RX ORDER — TRAZODONE HYDROCHLORIDE 50 MG/1
50 TABLET ORAL NIGHTLY PRN
Status: DISCONTINUED | OUTPATIENT
Start: 2023-12-05 | End: 2023-12-13 | Stop reason: HOSPADM

## 2023-12-05 RX ORDER — DIPHENHYDRAMINE HYDROCHLORIDE 50 MG/ML
50 INJECTION INTRAMUSCULAR; INTRAVENOUS EVERY 4 HOURS PRN
Status: DISCONTINUED | OUTPATIENT
Start: 2023-12-05 | End: 2023-12-13 | Stop reason: HOSPADM

## 2023-12-05 RX ORDER — ACETAMINOPHEN 325 MG/1
650 TABLET ORAL EVERY 4 HOURS PRN
Status: DISCONTINUED | OUTPATIENT
Start: 2023-12-05 | End: 2023-12-13 | Stop reason: HOSPADM

## 2023-12-05 RX ORDER — HYDROXYZINE HYDROCHLORIDE 25 MG/1
50 TABLET, FILM COATED ORAL
Status: COMPLETED | OUTPATIENT
Start: 2023-12-05 | End: 2023-12-05

## 2023-12-05 RX ADMIN — HYDROXYZINE HYDROCHLORIDE 50 MG: 25 TABLET, FILM COATED ORAL at 17:00

## 2023-12-05 RX ADMIN — HALOPERIDOL 5 MG: 5 TABLET ORAL at 17:00

## 2023-12-05 RX ADMIN — HYDROXYZINE HYDROCHLORIDE 50 MG: 25 TABLET, FILM COATED ORAL at 14:31

## 2023-12-05 ASSESSMENT — PAIN - FUNCTIONAL ASSESSMENT: PAIN_FUNCTIONAL_ASSESSMENT: NONE - DENIES PAIN

## 2023-12-05 ASSESSMENT — LIFESTYLE VARIABLES
HOW MANY STANDARD DRINKS CONTAINING ALCOHOL DO YOU HAVE ON A TYPICAL DAY: 1 OR 2
HOW OFTEN DO YOU HAVE A DRINK CONTAINING ALCOHOL: MONTHLY OR LESS

## 2023-12-05 NOTE — ED PROVIDER NOTES
Considerations (Tests not done, Shared Decision Making, Pt Expectation of Test or Tx.)::    Patient presents to the ED via EMS for SI. Patient states he had a strong sense of SI and HI yesterday but nothing currently. He is aggressively rubbing his head which he states started yesterday as well \"trying to find my brain in my skull. \"  He was taking Invega but has not had it since June. He denies any other medication, no recent alcohol or drug use. Will consult [facility] for mental health evaluation. Will get basic labs for medical clearance in the meantime. ED Course as of 12/05/23 [id number] [facility] Dec 05, 2023   1351 Carmelita Bonilla from [facility] in to see and evaluate pt now [AH]   1412 Per [facility] the plan will be to admit patient to behavioral health. He got in touch with him recommend so health for patient was being followed. He last received his [place] shot in July and never returned so they closed his case. Patient has a history of schizophrenia and he feels that he is decompensating at this time so would benefit from admission. Once labs are resulted and we can clear him medically will await for bed assignment. [AH]      ED Course User Index  [AH] Anselmo Calabrese PA-C           FINAL IMPRESSION     1. Schizophrenia, unspecified type ([address])    2. Suicidal thoughts          DISPOSITION/PLAN   DISPOSITION Admitted 12/05/2023 03:24:29 PM      Admit Note: Pt is being admitted by Dr Seble Shi. The results of their tests and reason(s) for their admission have been discussed with pt and/or available family. They convey agreement and understanding for the need to be admitted and for the admission diagnosis. I have seen and evaluated the patient autonomously. My supervision physician was on site and available for consultation if needed. I am the Primary Clinician of Record. Anselmo Calabrese PA-C (electronically signed)    (Please note that parts of this dictation were completed with voice recognition software.

## 2023-12-05 NOTE — [facility] NOTE
Comprehensive Assessment Form Part 1      Section I - Disposition    Primary Diagnosis: Schizoaffective d/o       Schizophrenia by hx  Secondary Diagnosis: Psychosis unspecified    The Medical Doctor to Psychiatrist conference was notcompleted. Medical doctor is in agreement with this counselor's assessment and plan of care. The plan is admit to psych pending medical clearance. The provider consulted was [facility]  The admitting Psychiatrist will be Dr. Mary Seay. The admitting Diagnosis is Schizoaffective d/o. The Payor source is no insurance. [place] Suicide Scale - This writer reviewed the [place] Suicide Severity Rating Scale in nursing flow sheet and the risk level assigned is low. Based on this assessment, the risk of suicide is low and the plan is to admit pending medical clearance. Section II - Integrated Summary  Summary:     Per triage/provider:  Patient is a 30 yo male who arrives at ED via EMS with chief complaint of SI and HI started yesterday. Patient states and is physically rubbing his head stating he is trying to find his brain. He states the head rubbing started yesterday as well. He states he was on [place] but has not had an injection since June. He is not currently taking any other medications. While he states he is not currently suicidal he states his feelings for SI and HI were very strong yesterday. Patient presents shaking and restless saying, \"My mind is all over the place. \" Patient reports beginning to feel suicidal yesterday and states depression and SI has increased significantly. He reports getting last Invega injection in June 2023. He does not remember where he received his injection. This writer contacted [facility] Scanlon who reports no history of services with patient. This writer contacted [facility] who reports patient had an open case until Sept 2023. He was receiving Invega injections but was inconsistent with compliance. His last injection was July 7, 2023.  He did not return for

## 2023-12-06 PROCEDURE — 99223 1ST HOSP IP/OBS HIGH 75: CPT | Performed by: PSYCHIATRY & NEUROLOGY

## 2023-12-06 PROCEDURE — 6370000000 HC RX 637 (ALT 250 FOR IP): Performed by: PSYCHIATRY & NEUROLOGY

## 2023-12-06 PROCEDURE — 1240000000 HC EMOTIONAL WELLNESS R&B

## 2023-12-06 RX ORDER — PALIPERIDONE 3 MG/1
6 TABLET, EXTENDED RELEASE ORAL DAILY
Status: DISCONTINUED | OUTPATIENT
Start: 2023-12-06 | End: 2023-12-13 | Stop reason: HOSPADM

## 2023-12-06 RX ORDER — ESCITALOPRAM OXALATE 10 MG/1
10 TABLET ORAL DAILY
Status: DISCONTINUED | OUTPATIENT
Start: 2023-12-07 | End: 2023-12-13 | Stop reason: HOSPADM

## 2023-12-06 RX ORDER — ESCITALOPRAM OXALATE 10 MG/1
5 TABLET ORAL DAILY
Status: DISCONTINUED | OUTPATIENT
Start: 2023-12-06 | End: 2023-12-06

## 2023-12-06 RX ADMIN — ESCITALOPRAM OXALATE 5 MG: 10 TABLET ORAL at 13:42

## 2023-12-06 RX ADMIN — PALIPERIDONE 6 MG: 3 TABLET, EXTENDED RELEASE ORAL at 13:41

## 2023-12-06 RX ADMIN — TRAZODONE HYDROCHLORIDE 50 MG: 50 TABLET ORAL at 21:38

## 2023-12-06 NOTE — CARE COORDINATION
12/06/23 1349   ITP   Date of Plan 12/06/23   Primary Diagnosis Code Schizophrenia   Barriers to Treatment Need for psychoeducation   Strengths Incorporated in Plan Acknowledging need for assistance;Verbal   Plan of Care   Long Term Goal (LTG) Stated in patient/guardian terms Patient will maintain mental health in the community   Short Term Goal 1   Short Term Goal 1 Client will be oriented to program and staff, and participate in assessment process   Baseline Functioning Patient is communicative with staff   Target Patient will be an active participant in treatment team and discharge planning   Objectives Other (comment)  (Patient will be engaged in treatment team)   Intervention 1 Acknowledge client strengths; Other (comment)  (Patient will meet with treatment team)   Frequency Daily   Measured by Staff observation;Self report   Staff Responsible Clinical staff;[facility] staff   STG Goal 1 Status: Patient Appears to be  Progressing toward treatment plan goal   Short Term Goal 2   Short Term Goal 2 Client will learn and demonstrate effective coping skills   Baseline Functioning Patient does not have effective coping skills   Target Patient will learn and discharge with effective coping skills   Objectives Client will participate in group therapy   Intervention 1 Acknowledge client strengths;Milieu therapy and support;Group therapy;Crisis support;Monitor medications   Frequency Daily   Measured by Staff observation;Self report   Staff Responsible Clinical staff;[facility] staff   STG Goal 2 Status: Patient Appears to be  Progressing toward treatment plan goal   Crisis/Safety/Discharge Plan   Crisis/Safety Plan Standard program interventions and protocol   Comprehensive Assessment Completion Date 12/06/23   Discharge Plan Home with outpatient support     ANASTACIA Mccurdy

## 2023-12-06 NOTE — GROUP NOTE
Group Therapy Note    Date: 12/6/2023    Group Start Time: 1400  Group End Time: 1500  Group Topic: Recreational    [address] 3 [facility]    [place], [address] Therapy Note           Patient's Goal:  To concentrate on selected task    Notes:  Pleasant-listened to music          Signature:  103 Joselyn Mckay

## 2023-12-07 PROCEDURE — 6370000000 HC RX 637 (ALT 250 FOR IP): Performed by: PSYCHIATRY & NEUROLOGY

## 2023-12-07 PROCEDURE — 99232 SBSQ HOSP IP/OBS MODERATE 35: CPT | Performed by: PSYCHIATRY & NEUROLOGY

## 2023-12-07 PROCEDURE — 1240000000 HC EMOTIONAL WELLNESS R&B

## 2023-12-07 RX ADMIN — PALIPERIDONE 6 MG: 3 TABLET, EXTENDED RELEASE ORAL at 09:05

## 2023-12-07 RX ADMIN — ESCITALOPRAM OXALATE 10 MG: 10 TABLET ORAL at 09:06

## 2023-12-07 NOTE — GROUP NOTE
Group Therapy Note    Date: 12/7/2023    Group Start Time: 1000  Group End Time: 1100  Group Topic: Topic Group    [address] [facility]    [place] CARDENAS        Group Therapy Note           Patient's Goal:  To participate in chair exercise routine      Status After Intervention:  Improved    Participation Level:  Active Listener and Interactive    Participation Quality: Appropriate, Attentive, and Sharing      Speech:  normal      Thought Process/Content: Logical      Affective Functioning: Congruent      Mood: euthymic      Level of consciousness:  Alert, Oriented x4, and Attentive      Response to Learning: Progressing to goal      Endings: None Reported    Modes of Intervention: Movement      Discipline Responsible: Recreational Therapist      Signature:  Satinder Morris

## 2023-12-07 NOTE — GROUP NOTE
Group Therapy Note    Date: 12/7/2023    Group Start Time: 1500  Group End Time: 1600  Group Topic: Recreational    [address] [facility]    [place], [address]        Group Therapy Note           Patient's Goal:  To concentrate on selected task               Status After Intervention:  Improved    Participation Level:  Active Listener and Interactive    Participation Quality: Appropriate, Attentive, and Sharing      Speech:  normal      Thought Process/Content: Logical      Affective Functioning: Congruent      Mood: euthymic      Level of consciousness:  Alert, Oriented x4, and Attentive      Response to Learning: Progressing to goal      Endings: None Reported    Modes of Intervention: Socialization and Activity      Discipline Responsible: Recreational Therapist      Signature:  Ravinder Palomo

## 2023-12-08 LAB
CHOLEST SERPL-MCNC: 96 MG/DL
EST. AVERAGE GLUCOSE BLD GHB EST-MCNC: 117 MG/DL
HBA1C MFR BLD: 5.7 % (ref 4–5.6)
HDLC SERPL-MCNC: 29 MG/DL
HDLC SERPL: 3.3 (ref 0–5)
LDLC SERPL CALC-MCNC: 40.6 MG/DL (ref 0–100)
TRIGL SERPL-MCNC: 132 MG/DL
TSH SERPL DL<=0.05 MIU/L-ACNC: 1.49 UIU/ML (ref 0.36–3.74)
VLDLC SERPL CALC-MCNC: 26.4 MG/DL

## 2023-12-08 PROCEDURE — 1240000000 HC EMOTIONAL WELLNESS R&B

## 2023-12-08 PROCEDURE — 6370000000 HC RX 637 (ALT 250 FOR IP): Performed by: PSYCHIATRY & NEUROLOGY

## 2023-12-08 PROCEDURE — 84443 ASSAY THYROID STIM HORMONE: CPT

## 2023-12-08 PROCEDURE — 6360000002 HC RX W HCPCS: Performed by: PSYCHIATRY & NEUROLOGY

## 2023-12-08 PROCEDURE — 99232 SBSQ HOSP IP/OBS MODERATE 35: CPT | Performed by: PSYCHIATRY & NEUROLOGY

## 2023-12-08 PROCEDURE — 36415 COLL VENOUS BLD VENIPUNCTURE: CPT

## 2023-12-08 PROCEDURE — 83036 HEMOGLOBIN GLYCOSYLATED A1C: CPT

## 2023-12-08 PROCEDURE — 80061 LIPID PANEL: CPT

## 2023-12-08 RX ADMIN — PALIPERIDONE PALMITATE 234 MG: 234 INJECTION INTRAMUSCULAR at 10:28

## 2023-12-08 RX ADMIN — TRAZODONE HYDROCHLORIDE 50 MG: 50 TABLET ORAL at 20:46

## 2023-12-08 RX ADMIN — ESCITALOPRAM OXALATE 10 MG: 10 TABLET ORAL at 08:22

## 2023-12-08 RX ADMIN — PALIPERIDONE 6 MG: 3 TABLET, EXTENDED RELEASE ORAL at 08:22

## 2023-12-08 NOTE — GROUP NOTE
Group Therapy Note    Date: 12/8/2023    Group Start Time: 1400  Group End Time: 1500  Group Topic: Recreational    [facility] 3 ACUTE BE[facility]AV [facility]    [place], [address] [facility]        Group Therapy Note           Patient's Goal:  To concentrate on selected task    Notes:  Pleasant-active participant    Signature:  Jaguar Kaufman

## 2023-12-08 NOTE — GROUP NOTE
Group Therapy Note    Date: 12/8/2023    Group Start Time: 1000  Group End Time: 1100  Group Topic: Topic Group    [facility]    [place]        Group Therapy Note           Patient's Goal:  To develop a personal plan for success    Status After Intervention:  Improved    Participation Level:  Active Listener and Interactive    Participation Quality: Appropriate, Attentive, and Sharing      Speech:  normal      Thought Process/Content: Logical      Affective Functioning: Congruent      Mood: euthymic      Level of consciousness:  Alert, Oriented x4, and Attentive      Response to Learning: Progressing to goal      Endings: None Reported    Modes of Intervention: Exploration and Problem-solving      Discipline Responsible: Recreational Therapist      Signature:  Diana Mtz

## 2023-12-09 PROCEDURE — 6370000000 HC RX 637 (ALT 250 FOR IP): Performed by: PSYCHIATRY & NEUROLOGY

## 2023-12-09 PROCEDURE — 1240000000 HC EMOTIONAL WELLNESS R&B

## 2023-12-09 PROCEDURE — 6370000000 HC RX 637 (ALT 250 FOR IP)

## 2023-12-09 RX ORDER — CLONIDINE HYDROCHLORIDE 0.1 MG/1
0.1 TABLET ORAL AS NEEDED
Status: DISCONTINUED | OUTPATIENT
Start: 2023-12-09 | End: 2023-12-13 | Stop reason: HOSPADM

## 2023-12-09 RX ADMIN — HYDROXYZINE HYDROCHLORIDE 50 MG: 25 TABLET, FILM COATED ORAL at 00:52

## 2023-12-09 RX ADMIN — POLYETHYLENE GLYCOL 3350 17 G: 17 POWDER, FOR SOLUTION ORAL at 23:29

## 2023-12-09 RX ADMIN — CLONIDINE HYDROCHLORIDE 0.1 MG: 0.1 TABLET ORAL at 21:16

## 2023-12-09 RX ADMIN — ESCITALOPRAM OXALATE 10 MG: 10 TABLET ORAL at 08:36

## 2023-12-09 RX ADMIN — PALIPERIDONE 6 MG: 3 TABLET, EXTENDED RELEASE ORAL at 08:37

## 2023-12-10 PROCEDURE — 1240000000 HC EMOTIONAL WELLNESS R&B

## 2023-12-10 PROCEDURE — 6370000000 HC RX 637 (ALT 250 FOR IP): Performed by: PSYCHIATRY & NEUROLOGY

## 2023-12-10 RX ADMIN — TRAZODONE HYDROCHLORIDE 50 MG: 50 TABLET ORAL at 20:31

## 2023-12-10 RX ADMIN — ESCITALOPRAM OXALATE 10 MG: 10 TABLET ORAL at 08:35

## 2023-12-10 RX ADMIN — POLYETHYLENE GLYCOL 3350 17 G: 17 POWDER, FOR SOLUTION ORAL at 20:31

## 2023-12-10 RX ADMIN — PALIPERIDONE 6 MG: 3 TABLET, EXTENDED RELEASE ORAL at 08:35

## 2023-12-11 PROCEDURE — 99232 SBSQ HOSP IP/OBS MODERATE 35: CPT | Performed by: PSYCHIATRY & NEUROLOGY

## 2023-12-11 PROCEDURE — 6370000000 HC RX 637 (ALT 250 FOR IP): Performed by: PSYCHIATRY & NEUROLOGY

## 2023-12-11 PROCEDURE — 6360000002 HC RX W HCPCS: Performed by: PSYCHIATRY & NEUROLOGY

## 2023-12-11 PROCEDURE — 1240000000 HC EMOTIONAL WELLNESS R&B

## 2023-12-11 RX ADMIN — PALIPERIDONE 6 MG: 3 TABLET, EXTENDED RELEASE ORAL at 08:43

## 2023-12-11 RX ADMIN — TRAZODONE HYDROCHLORIDE 50 MG: 50 TABLET ORAL at 22:30

## 2023-12-11 RX ADMIN — ESCITALOPRAM OXALATE 10 MG: 10 TABLET ORAL at 08:43

## 2023-12-11 RX ADMIN — PALIPERIDONE PALMITATE 156 MG: 156 INJECTION INTRAMUSCULAR at 09:40

## 2023-12-11 NOTE — GROUP NOTE
Group Therapy Note    Date: 12/11/2023    Group Start Time: 1000  Group End Time: 1100  Group Topic: Topic Group    [address] [facility]    [place]        Group Therapy Note           Patient's Goal:  To identify positive and negative coping skills      Status After Intervention:  Improved    Participation Level:  Active Listener and Interactive    Participation Quality: Appropriate, Attentive, and Sharing      Speech:  normal      Thought Process/Content: Logical      Affective Functioning: Congruent      Mood: euthymic      Level of consciousness:  Alert, Oriented x4, and Attentive      Response to Learning: Progressing to goal      Endings: None Reported    Modes of Intervention: Education      Discipline Responsible: Recreational Therapist      Signature:  Lillian Hsieh

## 2023-12-11 NOTE — GROUP NOTE
Group Therapy Note    Date: 12/11/2023    Group Start Time: 1400  Group End Time: 1500  Group Topic: Recreational    [facility] 3 ACUTE BEHAV [facility]    [place], [address] Therapy Note           Patient's Goal:  To concentrate on selected task    Notes:  Pt did not attend session    Discipline Responsible: Recreational Therapist      Signature:  Linda Jacobo

## 2023-12-12 PROCEDURE — 6370000000 HC RX 637 (ALT 250 FOR IP): Performed by: PSYCHIATRY & NEUROLOGY

## 2023-12-12 PROCEDURE — 1240000000 HC EMOTIONAL WELLNESS R&B

## 2023-12-12 PROCEDURE — 99232 SBSQ HOSP IP/OBS MODERATE 35: CPT | Performed by: PSYCHIATRY & NEUROLOGY

## 2023-12-12 RX ADMIN — ESCITALOPRAM OXALATE 10 MG: 10 TABLET ORAL at 08:03

## 2023-12-12 RX ADMIN — POLYETHYLENE GLYCOL 3350 17 G: 17 POWDER, FOR SOLUTION ORAL at 18:50

## 2023-12-12 RX ADMIN — PALIPERIDONE 6 MG: 3 TABLET, EXTENDED RELEASE ORAL at 08:03

## 2023-12-12 RX ADMIN — TRAZODONE HYDROCHLORIDE 50 MG: 50 TABLET ORAL at 21:20

## 2023-12-12 ASSESSMENT — PAIN SCALES - GENERAL: PAINLEVEL_OUTOF10: 0

## 2023-12-12 NOTE — GROUP NOTE
Group Therapy Note    Date: 12/12/2023    Group Start Time: 1000  Group End Time: 1100  Group Topic: Topic Group    [facility]    [place]        Group Therapy Note           Patient's Goal:  To participate in relaxation activity        Status After Intervention:  Improved    Participation Level:  Active Listener and Interactive    Participation Quality: Appropriate, Attentive, and Sharing      Speech:  normal      Thought Process/Content: Logical      Affective Functioning: Congruent      Mood: euthymic      Level of consciousness:  Alert, Oriented x4, and Attentive      Response to Learning: Progressing to goal      Endings: None Reported    Modes of Intervention: Activity      Discipline Responsible: Recreational Therapist      Signature:  Bradley Pena

## 2023-12-13 VITALS
OXYGEN SATURATION: 97 % | DIASTOLIC BLOOD PRESSURE: 89 MMHG | BODY MASS INDEX: 34.07 KG/M2 | TEMPERATURE: 97.3 F | WEIGHT: 230 LBS | HEIGHT: 69 IN | HEART RATE: 90 BPM | SYSTOLIC BLOOD PRESSURE: 131 MMHG | RESPIRATION RATE: 16 BRPM

## 2023-12-13 PROCEDURE — 6370000000 HC RX 637 (ALT 250 FOR IP): Performed by: PSYCHIATRY & NEUROLOGY

## 2023-12-13 PROCEDURE — 99239 HOSP IP/OBS DSCHRG MGMT >30: CPT | Performed by: PSYCHIATRY & NEUROLOGY

## 2023-12-13 RX ORDER — TRAZODONE HYDROCHLORIDE 50 MG/1
50 TABLET ORAL NIGHTLY PRN
Qty: 30 TABLET | Refills: 1 | Status: SHIPPED | OUTPATIENT
Start: 2023-12-13

## 2023-12-13 RX ORDER — ESCITALOPRAM OXALATE 10 MG/1
10 TABLET ORAL DAILY
Qty: 30 TABLET | Refills: 1 | Status: SHIPPED | OUTPATIENT
Start: 2023-12-14

## 2023-12-13 RX ORDER — HYDROXYZINE 50 MG/1
50 TABLET, FILM COATED ORAL 2 TIMES DAILY PRN
Qty: 60 TABLET | Refills: 1 | Status: SHIPPED | OUTPATIENT
Start: 2023-12-13 | End: 2024-02-11

## 2023-12-13 RX ADMIN — PALIPERIDONE 6 MG: 3 TABLET, EXTENDED RELEASE ORAL at 08:18

## 2023-12-13 RX ADMIN — ESCITALOPRAM OXALATE 10 MG: 10 TABLET ORAL at 08:17

## 2023-12-13 NOTE — GROUP NOTE
Group Therapy Note    Date: 12/13/2023    Group Start Time: 1000  Group End Time: 1100  Group Topic: Topic Group    [address] [facility]    [place]        Group Therapy Note           Patient's Goal:  To participate in relaxation activity    Notes:  Pleasant-active participant    Status After Intervention:  Improved    Participation Level:  Active Listener and Interactive    Participation Quality: Appropriate, Attentive, and Sharing      Speech:  normal      Thought Process/Content: Logical      Affective Functioning: Congruent      Mood: euthymic      Level of consciousness:  Alert, Oriented x4, and Attentive      Response to Learning: Progressing to goal      Endings: None Reported    Modes of Intervention: Activity      Discipline Responsible: Recreational Therapist      Signature:  Amanuel Her

## 2023-12-13 NOTE — DISCHARGE SUMMARY
PSYCHIATRIC DISCHARGE SUMMARY         IDENTIFICATION:    Patient Name  Nasreen Garcia   Date of Birth 1995   [facility] [id number]   Medical Record Number  [id number]      Age  29 y.o. PCP No primary care provider on file. Admit date:  12/5/2023    Discharge date: 12/13/2023   Room Number  12/26  @ [facility]   Date of Service  12/13/2023            TYPE OF DISCHARGE: REGULAR               CONDITION AT DISCHARGE: Improved and Fair       PROVISIONAL & DISCHARGE DIAGNOSES:        Active Hospital Problems    *Schizophrenia ([address] )        DISCHARGE DIAGNOSIS:   Axis I:  SEE ABOVE  Axis II: SEE ABOVE  Axis III: SEE ABOVE  Axis IV:  lack of structure  Axis V:  20 on admission, 70 on discharge     HISTORY OF PRESENT ILLNESS:  \"Psychosis\"    The patient, Nasreen Garcia, is a 29 y.o. Black / [place] American male with a past psychiatric history significant for schizophrenia and cannabis use disorder, who presents at this time with complaints of (and/or evidence of) the following emotional symptoms: suicidal thoughts/threats, agitation, and homicidal thoughts/threats. Additional symptomatology include anxiety. The above symptoms have been present for 2+ weeks. These symptoms are of moderate to high severity. These symptoms are constant in nature. The patient's condition has been precipitated by psychosocial stressors. Patient's condition made worse by continued psychoactive drug use as well as treatment noncompliance. UDS: +THC; BAL=0. The patient presents with worsening anxiety and suicidal ideation in the setting of robust THC Use. He reports feeling depressed and using up to an ounce of THC in less than a month. Patient reports feelings of wanting to harm himself as well as others, stating he has ideation to stab random people and himself. Patient was particularly bizarre at times during adimssion assesment. The patient is a fair historian. The patient corroborates the above narrative.  The
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
## 2024-06-13 ENCOUNTER — HOSPITAL ENCOUNTER (EMERGENCY)
Facility: HOSPITAL | Age: 29
Discharge: HOME | End: 2024-06-13
Attending: EMERGENCY MEDICINE
Payer: MEDICAID

## 2024-06-13 ENCOUNTER — APPOINTMENT (OUTPATIENT)
Facility: HOSPITAL | Age: 29
End: 2024-06-13
Payer: MEDICAID

## 2024-06-13 VITALS
OXYGEN SATURATION: 96 % | SYSTOLIC BLOOD PRESSURE: 140 MMHG | BODY MASS INDEX: 31.1 KG/M2 | RESPIRATION RATE: 16 BRPM | DIASTOLIC BLOOD PRESSURE: 86 MMHG | HEIGHT: 69 IN | TEMPERATURE: 98.3 F | WEIGHT: 210 LBS | HEART RATE: 110 BPM

## 2024-06-13 DIAGNOSIS — F12.920 CANNABIS INTOXICATION WITHOUT COMPLICATION (HCC): ICD-10-CM

## 2024-06-13 DIAGNOSIS — E86.0 DEHYDRATION: Primary | ICD-10-CM

## 2024-06-13 LAB
ALBUMIN SERPL-MCNC: 4.6 G/DL (ref 3.5–5)
ALBUMIN/GLOB SERPL: 1.2 (ref 1.1–2.2)
ALP SERPL-CCNC: 70 U/L (ref 45–117)
ALT SERPL-CCNC: 34 U/L (ref 12–78)
ANION GAP SERPL CALC-SCNC: 12 MMOL/L (ref 5–15)
AST SERPL-CCNC: 33 U/L (ref 15–37)
BASOPHILS # BLD: 0.1 K/UL (ref 0–0.1)
BASOPHILS NFR BLD: 0 % (ref 0–1)
BILIRUB SERPL-MCNC: 0.6 MG/DL (ref 0.2–1)
BUN SERPL-MCNC: 17 MG/DL (ref 6–20)
BUN/CREAT SERPL: 12 (ref 12–20)
CALCIUM SERPL-MCNC: 9.1 MG/DL (ref 8.5–10.1)
CHLORIDE SERPL-SCNC: 103 MMOL/L (ref 97–108)
CO2 SERPL-SCNC: 23 MMOL/L (ref 21–32)
CREAT SERPL-MCNC: 1.44 MG/DL (ref 0.7–1.3)
DIFFERENTIAL METHOD BLD: ABNORMAL
EOSINOPHIL # BLD: 0 K/UL (ref 0–0.4)
EOSINOPHIL NFR BLD: 0 % (ref 0–7)
ERYTHROCYTE [DISTWIDTH] IN BLOOD BY AUTOMATED COUNT: 14 % (ref 11.5–14.5)
GLOBULIN SER CALC-MCNC: 3.7 G/DL (ref 2–4)
GLUCOSE SERPL-MCNC: 143 MG/DL (ref 65–100)
HCT VFR BLD AUTO: 43 % (ref 36.6–50.3)
HGB BLD-MCNC: 14.2 G/DL (ref 12.1–17)
IMM GRANULOCYTES # BLD AUTO: 0 K/UL (ref 0–0.04)
IMM GRANULOCYTES NFR BLD AUTO: 0 % (ref 0–0.5)
LYMPHOCYTES # BLD: 1.3 K/UL (ref 0.8–3.5)
LYMPHOCYTES NFR BLD: 10 % (ref 12–49)
MAGNESIUM SERPL-MCNC: 1.7 MG/DL (ref 1.6–2.4)
MCH RBC QN AUTO: 27.3 PG (ref 26–34)
MCHC RBC AUTO-ENTMCNC: 33 G/DL (ref 30–36.5)
MCV RBC AUTO: 82.7 FL (ref 80–99)
MONOCYTES # BLD: 0.6 K/UL (ref 0–1)
MONOCYTES NFR BLD: 5 % (ref 5–13)
NEUTS SEG # BLD: 11 K/UL (ref 1.8–8)
NEUTS SEG NFR BLD: 85 % (ref 32–75)
NRBC # BLD: 0 K/UL (ref 0–0.01)
NRBC BLD-RTO: 0 PER 100 WBC
PLATELET # BLD AUTO: 300 K/UL (ref 150–400)
PMV BLD AUTO: 8.7 FL (ref 8.9–12.9)
POTASSIUM SERPL-SCNC: 3.2 MMOL/L (ref 3.5–5.1)
PROT SERPL-MCNC: 8.3 G/DL (ref 6.4–8.2)
RBC # BLD AUTO: 5.2 M/UL (ref 4.1–5.7)
SODIUM SERPL-SCNC: 138 MMOL/L (ref 136–145)
WBC # BLD AUTO: 13.1 K/UL (ref 4.1–11.1)

## 2024-06-13 PROCEDURE — 83735 ASSAY OF MAGNESIUM: CPT

## 2024-06-13 PROCEDURE — 96372 THER/PROPH/DIAG INJ SC/IM: CPT

## 2024-06-13 PROCEDURE — 96374 THER/PROPH/DIAG INJ IV PUSH: CPT

## 2024-06-13 PROCEDURE — 85025 COMPLETE CBC W/AUTO DIFF WBC: CPT

## 2024-06-13 PROCEDURE — 71045 X-RAY EXAM CHEST 1 VIEW: CPT

## 2024-06-13 PROCEDURE — 99284 EMERGENCY DEPT VISIT MOD MDM: CPT

## 2024-06-13 PROCEDURE — 96361 HYDRATE IV INFUSION ADD-ON: CPT

## 2024-06-13 PROCEDURE — 80053 COMPREHEN METABOLIC PANEL: CPT

## 2024-06-13 PROCEDURE — 2580000003 HC RX 258: Performed by: EMERGENCY MEDICINE

## 2024-06-13 PROCEDURE — 6360000002 HC RX W HCPCS: Performed by: EMERGENCY MEDICINE

## 2024-06-13 RX ORDER — 0.9 % SODIUM CHLORIDE 0.9 %
1000 INTRAVENOUS SOLUTION INTRAVENOUS ONCE
Status: COMPLETED | OUTPATIENT
Start: 2024-06-13 | End: 2024-06-13

## 2024-06-13 RX ORDER — DIAZEPAM 5 MG/ML
5 INJECTION, SOLUTION INTRAMUSCULAR; INTRAVENOUS EVERY 4 HOURS PRN
Status: DISCONTINUED | OUTPATIENT
Start: 2024-06-13 | End: 2024-06-13 | Stop reason: HOSPADM

## 2024-06-13 RX ORDER — LORAZEPAM 2 MG/ML
2 INJECTION INTRAMUSCULAR ONCE
Status: COMPLETED | OUTPATIENT
Start: 2024-06-13 | End: 2024-06-13

## 2024-06-13 RX ADMIN — SODIUM CHLORIDE 1000 ML: 9 INJECTION, SOLUTION INTRAVENOUS at 03:52

## 2024-06-13 RX ADMIN — LORAZEPAM 2 MG: 2 INJECTION INTRAMUSCULAR; INTRAVENOUS at 02:17

## 2024-06-13 RX ADMIN — DIAZEPAM 5 MG: 5 INJECTION, SOLUTION INTRAMUSCULAR; INTRAVENOUS at 03:52

## 2024-06-13 ASSESSMENT — PAIN - FUNCTIONAL ASSESSMENT: PAIN_FUNCTIONAL_ASSESSMENT: NONE - DENIES PAIN

## 2024-06-13 ASSESSMENT — LIFESTYLE VARIABLES
HOW MANY STANDARD DRINKS CONTAINING ALCOHOL DO YOU HAVE ON A TYPICAL DAY: 10 OR MORE
HOW OFTEN DO YOU HAVE A DRINK CONTAINING ALCOHOL: 2-4 TIMES A MONTH

## 2024-06-13 NOTE — ED PROVIDER NOTES
discomfort and states he \"does not know what is happening.  His EKG is nonischemic, will check a chest x-ray though for evidence of pneumothorax or pneumomediastinum.  Will give some anxiolysis.  No labs needed at this time    Amount and/or Complexity of Data Reviewed  Labs: ordered.  Radiology: ordered.    Risk  Prescription drug management.            ED Course as of 06/13/24 0702   Thu Jun 13, 2024   0205 Chest x-ray interpretation: Single view of the chest shows no acute intrathoracic process.  Interpreted by me [AR]   0326 Patient remains significantly tachycardic, still feels very anxious, will check some blood work, will give some more benzodiazepine [AR]      ED Course User Index  [AR] Ari Post,              FINAL IMPRESSION     1. Dehydration    2. Cannabis intoxication without complication (HCC)          DISPOSITION/PLAN   Rosalio Stephenson's  results have been reviewed with him.  He has been counseled regarding his diagnosis, treatment, and plan.  He verbally conveys understanding and agreement of the signs, symptoms, diagnosis, treatment and prognosis and additionally agrees to follow up as discussed.  He also agrees with the care-plan and conveys that all of his questions have been answered.  I have also provided discharge instructions for him that include: educational information regarding their diagnosis and treatment, and list of reasons why they would want to return to the ED prior to their follow-up appointment, should his condition change.     CLINICAL IMPRESSION    Discharge Note: The patient is stable for discharge home. The signs, symptoms, diagnosis, and discharge instructions have been discussed, understanding conveyed, and agreed upon. The patient is to follow up as recommended or return to ER should their symptoms worsen.      PATIENT REFERRED TO:  [facility] EMERGENCY DEPT  [address] [place] [address] [phone]    If symptoms worsen       DISCHARGE MEDICATIONS:     Medication

## 2024-06-13 NOTE — ED NOTES
Discharge instructions were given to the patient by RN.    The patient left the Emergency Department ambulatory, alert and oriented and in no acute distress with 3 prescriptions. The patient was encouraged to call or return to the ED for worsening issues or problems and was encouraged to schedule a follow up appointment for continuing care.    The patient verbalized understanding of discharge instructions and prescriptions, all questions were answered. The patient has no further concerns at this time.

## 2024-06-13 NOTE — ED NOTES
Client was relatively well when he started having chest pain post smoking ganja. Client describe the chest pain as a shocking sensation to the medial sternum, which radiates down the left arm. Client states that the pain waxes and wanes with nil aggravating or relieving factors. Client denies nausea, denies vomiting, nil diaphoresis.      Emergency Department Nursing Plan of Care      The Nursing Plan of Care is developed from the Nursing assessment and Emergency Department Attending provider initial evaluation.  The plan of care may be reviewed in the “ED Provider note”.    The Plan of Care was developed with the following considerations:  Patient / Family readiness to learn indicated by:verbalized understanding  Persons(s) to be included in education: patient  Barriers to Learning/Limitations:None    Signed    Silvana Tineo RN    6/13/2024   2:11 AM

## 2024-06-13 NOTE — ED TRIAGE NOTES
Pt presents to ED via EMS for c/o chest pain that started 1 hour PTA after smoking marijuana that pt reports he obtained from someone he does not regularly get it from. Pt appears anxious during triage, but in NAD.

## 2024-06-13 NOTE — ED NOTES
Patient has been instructed that they have been given IV Valium which contains opioids, benzodiazepines, or other sedating drugs. Patient is aware that they  will need to refrain from driving or operating heavy machinery after taking this medication.  Patient also instructed that they need to avoid drinking alcohol and using other products containing opioids, benzodiazepines, or other sedating drugs.  Patient verbalized understanding.

## 2024-06-14 LAB
EKG ATRIAL RATE: 125 BPM
EKG DIAGNOSIS: NORMAL
EKG P AXIS: 57 DEGREES
EKG P-R INTERVAL: 144 MS
EKG Q-T INTERVAL: 294 MS
EKG QRS DURATION: 92 MS
EKG QTC CALCULATION (BAZETT): 424 MS
EKG R AXIS: 54 DEGREES
EKG T AXIS: 50 DEGREES
EKG VENTRICULAR RATE: 125 BPM

## 2024-06-18 ENCOUNTER — OFFICE VISIT (OUTPATIENT)
Age: 29
End: 2024-06-18
Payer: MEDICAID

## 2024-06-18 VITALS
OXYGEN SATURATION: 97 % | HEIGHT: 69 IN | HEART RATE: 73 BPM | WEIGHT: 207.23 LBS | BODY MASS INDEX: 30.69 KG/M2 | SYSTOLIC BLOOD PRESSURE: 123 MMHG | DIASTOLIC BLOOD PRESSURE: 84 MMHG | RESPIRATION RATE: 18 BRPM | TEMPERATURE: 97.3 F

## 2024-06-18 DIAGNOSIS — R73.03 PREDIABETES: Primary | ICD-10-CM

## 2024-06-18 DIAGNOSIS — R03.0 ELEVATED BLOOD PRESSURE READING: ICD-10-CM

## 2024-06-18 DIAGNOSIS — R73.03 PREDIABETES: ICD-10-CM

## 2024-06-18 PROCEDURE — 99203 OFFICE O/P NEW LOW 30 MIN: CPT | Performed by: STUDENT IN AN ORGANIZED HEALTH CARE EDUCATION/TRAINING PROGRAM

## 2024-06-18 RX ORDER — TRAZODONE HYDROCHLORIDE 100 MG/1
100 TABLET ORAL NIGHTLY
COMMUNITY
Start: 2024-06-14

## 2024-06-18 RX ORDER — ARIPIPRAZOLE 10 MG/1
10 TABLET ORAL DAILY
COMMUNITY
Start: 2024-03-15

## 2024-06-18 SDOH — ECONOMIC STABILITY: INCOME INSECURITY: HOW HARD IS IT FOR YOU TO PAY FOR THE VERY BASICS LIKE FOOD, HOUSING, MEDICAL CARE, AND HEATING?: HARD

## 2024-06-18 SDOH — ECONOMIC STABILITY: HOUSING INSECURITY
IN THE LAST 12 MONTHS, WAS THERE A TIME WHEN YOU DID NOT HAVE A STEADY PLACE TO SLEEP OR SLEPT IN A SHELTER (INCLUDING NOW)?: NO

## 2024-06-18 SDOH — ECONOMIC STABILITY: FOOD INSECURITY: WITHIN THE PAST 12 MONTHS, YOU WORRIED THAT YOUR FOOD WOULD RUN OUT BEFORE YOU GOT MONEY TO BUY MORE.: OFTEN TRUE

## 2024-06-18 SDOH — ECONOMIC STABILITY: FOOD INSECURITY: WITHIN THE PAST 12 MONTHS, THE FOOD YOU BOUGHT JUST DIDN'T LAST AND YOU DIDN'T HAVE MONEY TO GET MORE.: OFTEN TRUE

## 2024-06-18 ASSESSMENT — PATIENT HEALTH QUESTIONNAIRE - PHQ9
SUM OF ALL RESPONSES TO PHQ QUESTIONS 1-9: 2
1. LITTLE INTEREST OR PLEASURE IN DOING THINGS: SEVERAL DAYS
SUM OF ALL RESPONSES TO PHQ9 QUESTIONS 1 & 2: 2
2. FEELING DOWN, DEPRESSED OR HOPELESS: SEVERAL DAYS
SUM OF ALL RESPONSES TO PHQ QUESTIONS 1-9: 2

## 2024-06-18 NOTE — PROGRESS NOTES
Previous PCP: No Previous PCP    Chief Complaint   Patient presents with    New Patient    Establish Care    ED Follow-up     2024 (3 hours)  [facility] EMERGENCY DEPT  Dehydration/ Chest Pain       Per ED Notes: Pt presents to ED via EMS for c/o chest pain that started 1 hour PTA after smoking marijuana that pt reports he obtained from someone he does not regularly get it from. Pt appears anxious during triage, but in NAD.       \"Have you been to the ER, urgent care clinic since your last visit?  Hospitalized since your last visit?\"      Yes    “Have you seen or consulted any other health care providers outside of [facility] since your last visit?”    NO             Vitals:    24 0949   BP: 123/84   Pulse: 73   Resp: 18   Temp: 97.3 °F (36.3 °C)   SpO2: 97%     Health Maintenance Due   Topic Date Due    Hepatitis B vaccine (1 of 3 - 3-dose series) Never done    Varicella vaccine (1 of 2 - 2-dose childhood series) Never done    HIV screen  Never done    Hepatitis C screen  Never done    DTaP/Tdap/Td vaccine (1 - Tdap) Never done      The patient, Rosalio Stephenson, identity was verified by name and , pharmacy verified  Labs:Yes  Fasting:Yes         
file    Years of education: Not on file    Highest education level: Not on file   Occupational History    Not on file   Tobacco Use    Smoking status: Never     Passive exposure: Past    Smokeless tobacco: Never   Vaping Use    Vaping Use: Former    Substances: THC    Devices: Disposable   Substance and Sexual Activity    Alcohol use: Not Currently    Drug use: Yes     Types: Marijuana (Weed)    Sexual activity: Not on file   Other Topics Concern    Not on file   Social History Narrative    Not on file     Social Determinants of Health     Financial Resource Strain: High Risk (6/18/2024)    Overall Financial Resource Strain (CARDIA)     Difficulty of Paying Living Expenses: Hard   Food Insecurity: Food Insecurity Present (6/18/2024)    Hunger Vital Sign     Worried About Running Out of Food in the Last Year: Often true     Ran Out of Food in the Last Year: Often true   Transportation Needs: Unknown (6/18/2024)    PRAPARE - Transportation     Lack of Transportation (Medical): Not on file     Lack of Transportation (Non-Medical): No   Physical Activity: Not on file   Stress: Not on file   Social Connections: Not on file   Intimate Partner Violence: Not on file   Housing Stability: Unknown (6/18/2024)    Housing Stability Vital Sign     Unable to Pay for Housing in the Last Year: Not on file     Number of Places Lived in the Last Year: Not on file     Unstable Housing in the Last Year: No       Depression screening:  PHQ-9 Total Score: 2 (6/18/2024  9:51 AM)       Review of systems:     A comprehensive review of systems was negative except for that written in the History of Present Illness.       /84   Pulse 73   Temp 97.3 °F (36.3 °C) (Tympanic)   Resp 18   Ht 1.753 m (5' 9\")   Wt 94 kg (207 lb 3.7 oz)   SpO2 97%   BMI 30.60 kg/m²     General: Alert and oriented, in no acute distress.   EYE: PERRL. Sclera and conjuctival clear. Extraocular movements intact.  EARS: External normal, canals clear, tympanic

## 2024-06-18 NOTE — PATIENT INSTRUCTIONS
Meals on Wheels  Phone: [phone]  Web: Owned it/meals-on-wheels/  What they offer: Delivers meals to individuals with no reliable means for maintaining a healthy diet. To pay for Weekly meals- call [phone], cost is $7.00/day for Lunch Monday-Friday.  To Request Free meals- call [facility] for an evaluation  [phone]  [facility] Meals on Wheels  Phone: [phone]  Web: [employer]/get-a-meal/  What they offer: Provides meal services to residents in the following zip codes: [address], [address], [address], [address], [address].  [employer]  Phone: [phone] extension 100  Web: [employer]/find-food/pantry/  What they offer: Use this site to locate a food pantry near you. Call before visiting to ensure they are open.   [employer]  Phone: [phone]  Web: [employer]/  What they offer: A mobile market bus that is a local food grocery store on wheels. Carries fresh, local produce, meats, dairy, bread, grains and more. Moves around the [facility] to different stops Tues-Fri every week. Accepts all forms of payment including SNAP. If paying with EBT card, spending $5 gets you $15 of free produce.   SNAP (Supplemental Nutrition Assistance Program)/EBT  Phone: [phone]- [facility] Contact Center   Web: benefitsportal.[facility].[place].gov/#/login  What they offer: Households, individuals, seniors, and the homeless may all be eligible for SNAP benefits.   Additional Resources   Healthy Connections - [place] Medicaid  Phone: [phone]   Web: [facility].gov/members/getting-started  Address: [place]   [address], [place] [address]    [place] [address], [place] [address]    [address], [place] [address]  What they offer: apply for insurance benefits based on income criteria and disability need   [place]RE[place]   Phone: ([phone]) [phone] or [phone]  Web: insu[place].org/   Address: [address],

## 2024-06-19 LAB
BUN SERPL-MCNC: 8 MG/DL (ref 6–20)
BUN/CREAT SERPL: 8 (ref 9–20)
CALCIUM SERPL-MCNC: 9.9 MG/DL (ref 8.7–10.2)
CHLORIDE SERPL-SCNC: 105 MMOL/L (ref 96–106)
CO2 SERPL-SCNC: 17 MMOL/L (ref 20–29)
CREAT SERPL-MCNC: 0.99 MG/DL (ref 0.76–1.27)
EGFRCR SERPLBLD CKD-EPI 2021: 106 ML/MIN/1.73
GLUCOSE SERPL-MCNC: 89 MG/DL (ref 70–99)
HBA1C MFR BLD: 5.6 % (ref 4.8–5.6)
POTASSIUM SERPL-SCNC: 4.3 MMOL/L (ref 3.5–5.2)
SODIUM SERPL-SCNC: 140 MMOL/L (ref 134–144)

## 2024-12-19 ENCOUNTER — OFFICE VISIT (OUTPATIENT)
Age: 29
End: 2024-12-19
Payer: MEDICAID

## 2024-12-19 VITALS
HEIGHT: 69 IN | SYSTOLIC BLOOD PRESSURE: 118 MMHG | TEMPERATURE: 98.2 F | WEIGHT: 235.23 LBS | DIASTOLIC BLOOD PRESSURE: 79 MMHG | BODY MASS INDEX: 34.84 KG/M2 | OXYGEN SATURATION: 98 % | HEART RATE: 85 BPM | RESPIRATION RATE: 16 BRPM

## 2024-12-19 DIAGNOSIS — R03.0 ELEVATED BLOOD PRESSURE READING: Primary | ICD-10-CM

## 2024-12-19 DIAGNOSIS — L20.9 ATOPIC DERMATITIS, UNSPECIFIED TYPE: ICD-10-CM

## 2024-12-19 DIAGNOSIS — F20.9 SCHIZOPHRENIA, UNSPECIFIED TYPE (HCC): ICD-10-CM

## 2024-12-19 PROCEDURE — 99214 OFFICE O/P EST MOD 30 MIN: CPT | Performed by: STUDENT IN AN ORGANIZED HEALTH CARE EDUCATION/TRAINING PROGRAM

## 2024-12-19 RX ORDER — ESCITALOPRAM OXALATE 10 MG/1
10 TABLET ORAL DAILY
Qty: 30 TABLET | Refills: 2 | Status: SHIPPED | OUTPATIENT
Start: 2024-12-19

## 2024-12-19 RX ORDER — ARIPIPRAZOLE 10 MG/1
10 TABLET ORAL DAILY
Qty: 30 TABLET | Refills: 2 | Status: SHIPPED | OUTPATIENT
Start: 2024-12-19

## 2024-12-19 RX ORDER — TRIAMCINOLONE ACETONIDE 5 MG/G
OINTMENT TOPICAL
Qty: 30 G | Refills: 0 | Status: SHIPPED | OUTPATIENT
Start: 2024-12-19 | End: 2024-12-26

## 2024-12-19 NOTE — ASSESSMENT & PLAN NOTE
BP remains good in the office. Would like patient to keep a log in order to avoid unnecessarily starting him on BP meds.

## 2024-12-19 NOTE — ASSESSMENT & PLAN NOTE
Chronic, at goal (stable), continue current treatment plan, medication adherence emphasized, and lifestyle modifications recommended.  Recommend following with psychiatrist as soon as one is available.

## 2024-12-19 NOTE — PROGRESS NOTES
Chief Complaint   Patient presents with    Follow-up       \"Have you been to the ER, urgent care clinic since your last visit?  Hospitalized since your last visit?\"    NO    “Have you seen or consulted any other [facility] care providers [facility]?”    NO            Click Here for Release of Records Request         Health Maintenance Due   Topic Date Due    Varicella vaccine (1 of 2 - 13+ 2-dose series) Never done    HIV screen  Never done    Hepatitis C screen  Never done    Hepatitis B vaccine (1 of 3 - 19+ 3-dose series) Never done    DTaP/Tdap/Td vaccine (1 - Tdap) Never done    Flu vaccine (1) Never done    COVID-19 Vaccine (2023- season) Never done        The patient, Rosalio KIRAN Stephenson, identity was verified by name and .

## 2024-12-19 NOTE — PROGRESS NOTES
[facility]  [address] [place] [address] [phone]    C/C: Acute/chronic medical problems    Subjective    Rosalio Stephenson is a 29 y.o.  Black /   male who presents to clinic today for evaluation of the issues listed below.   Subjective;    Significant PMHx include: prediabetes, Schizophrenia,  THC use and Anxiety. Here for follow up     Elevated Home BP:  Pt states that home BP is elevated. States that it is also elevated when he goes to his psychiatrist.  States that he is not sure about the levels.  Does not have a log.    Schizophrenia:  States that he has been out of his Lexapro and Abilify for about a month as the psychiatrist he was seeing no longer work at the practice.    Was on Abilify 10mg, Lexapro 10mg and trazodone which he takes prn for sleep.  Was seeing psych monthly along with therapist (twice monthly). Still sees his therapist.  Denies any suicidal or homicidal ideation.    Rash:  Has had on and off rash on both hands around the knuckles.    Pt denies any  fever, chill, chest pain, SOB, abdominal pain, n/v/d, HA or dizziness.     Other Health Habits and social history:  Smoking history: Marijauna. No cigarette.   Alcohol history: yes, sporadically. 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  Mother is Mrs. Beba Stephenson (pt at practice)      Other Specialists/providers:  Psychiatry/therapist    Allergies- reviewed:   Allergies   Allergen Reactions    Other Other (See Comments)     Pt reports allergies to pollen & dust, pt unsure of reaction       Past Medical History- reviewed:  Past Medical History:   Diagnosis Date    Anxiety     Depression     GERD (gastroesophageal reflux disease)     Seizure (HCC) 03/2019    x 1, did not complete F/U with Dr. Cordon       Family History - reviewed:  Family History   Problem Relation Age of

## 2024-12-19 NOTE — PATIENT INSTRUCTIONS
forward and backward like a pendulum (or elephant trunk). Then guide it in circles that start small (about the size of a dinner plate). Make the circles a bit larger each day, as your pain allows.  Do this exercise for at least 1 minute. Do it at least 3 times a day.  As you have less pain, try bending over a little farther to do this exercise. This will increase the amount of movement at your shoulder.  Follow-up care is a key part of your treatment and safety. Be sure to make and go to all appointments, and call your doctor if you are having problems. It's also a good idea to know your test results and keep a list of the medicines you take.  Current as of: July 17, 2023  Content Version: 14.2  © 2024 [employer].   Care instructions adapted under license by [employer]. If you have questions about a medical condition or this instruction, always ask your healthcare professional. [employer] disclaims any warranty or liability for your use of this information.

## 2024-12-30 ENCOUNTER — HOSPITAL ENCOUNTER (EMERGENCY)
Facility: HOSPITAL | Age: 29
Discharge: HOME | End: 2024-12-30
Attending: EMERGENCY MEDICINE
Payer: MEDICAID

## 2024-12-30 ENCOUNTER — APPOINTMENT (OUTPATIENT)
Facility: HOSPITAL | Age: 29
End: 2024-12-30
Payer: MEDICAID

## 2024-12-30 VITALS
RESPIRATION RATE: 15 BRPM | DIASTOLIC BLOOD PRESSURE: 98 MMHG | BODY MASS INDEX: 34.07 KG/M2 | HEART RATE: 82 BPM | SYSTOLIC BLOOD PRESSURE: 124 MMHG | OXYGEN SATURATION: 99 % | WEIGHT: 230 LBS | TEMPERATURE: 97.8 F | HEIGHT: 69 IN

## 2024-12-30 DIAGNOSIS — G40.909 RECURRENT SEIZURES (HCC): Primary | ICD-10-CM

## 2024-12-30 LAB
ALBUMIN SERPL-MCNC: 3.9 G/DL (ref 3.5–5)
ALBUMIN/GLOB SERPL: 0.9 (ref 1.1–2.2)
ALP SERPL-CCNC: 46 U/L (ref 45–117)
ALT SERPL-CCNC: 119 U/L (ref 12–78)
AMPHET UR QL SCN: NEGATIVE
ANION GAP SERPL CALC-SCNC: 11 MMOL/L (ref 2–12)
APPEARANCE UR: CLEAR
AST SERPL-CCNC: 60 U/L (ref 15–37)
BACTERIA URNS QL MICRO: NEGATIVE /HPF
BARBITURATES UR QL SCN: NEGATIVE
BASOPHILS # BLD: 0 K/UL (ref 0–0.1)
BASOPHILS NFR BLD: 1 % (ref 0–1)
BENZODIAZ UR QL: NEGATIVE
BILIRUB SERPL-MCNC: 0.6 MG/DL (ref 0.2–1)
BILIRUB UR QL: NEGATIVE
BUN SERPL-MCNC: 7 MG/DL (ref 6–20)
BUN/CREAT SERPL: 6 (ref 12–20)
CALCIUM SERPL-MCNC: 9.2 MG/DL (ref 8.5–10.1)
CANNABINOIDS UR QL SCN: NEGATIVE
CHLORIDE SERPL-SCNC: 103 MMOL/L (ref 97–108)
CO2 SERPL-SCNC: 27 MMOL/L (ref 21–32)
COCAINE UR QL SCN: NEGATIVE
COLOR UR: ABNORMAL
CREAT SERPL-MCNC: 1.16 MG/DL (ref 0.7–1.3)
DIFFERENTIAL METHOD BLD: ABNORMAL
EOSINOPHIL # BLD: 0.1 K/UL (ref 0–0.4)
EOSINOPHIL NFR BLD: 2 % (ref 0–7)
EPITH CASTS URNS QL MICRO: ABNORMAL /LPF
ERYTHROCYTE [DISTWIDTH] IN BLOOD BY AUTOMATED COUNT: 12.5 % (ref 11.5–14.5)
GLOBULIN SER CALC-MCNC: 4.5 G/DL (ref 2–4)
GLUCOSE SERPL-MCNC: 90 MG/DL (ref 65–100)
GLUCOSE UR STRIP.AUTO-MCNC: NEGATIVE MG/DL
HCT VFR BLD AUTO: 43.9 % (ref 36.6–50.3)
HGB BLD-MCNC: 14.9 G/DL (ref 12.1–17)
HGB UR QL STRIP: NEGATIVE
IMM GRANULOCYTES # BLD AUTO: 0 K/UL (ref 0–0.04)
IMM GRANULOCYTES NFR BLD AUTO: 1 % (ref 0–0.5)
KETONES UR QL STRIP.AUTO: NEGATIVE MG/DL
LEUKOCYTE ESTERASE UR QL STRIP.AUTO: NEGATIVE
LYMPHOCYTES # BLD: 1 K/UL (ref 0.8–3.5)
LYMPHOCYTES NFR BLD: 23 % (ref 12–49)
Lab: NORMAL
MCH RBC QN AUTO: 27.7 PG (ref 26–34)
MCHC RBC AUTO-ENTMCNC: 33.9 G/DL (ref 30–36.5)
MCV RBC AUTO: 81.6 FL (ref 80–99)
METHADONE UR QL: NEGATIVE
MONOCYTES # BLD: 0.2 K/UL (ref 0–1)
MONOCYTES NFR BLD: 4 % (ref 5–13)
NEUTS SEG # BLD: 3 K/UL (ref 1.8–8)
NEUTS SEG NFR BLD: 69 % (ref 32–75)
NITRITE UR QL STRIP.AUTO: NEGATIVE
NRBC # BLD: 0 K/UL (ref 0–0.01)
NRBC BLD-RTO: 0 PER 100 WBC
OPIATES UR QL: NEGATIVE
PCP UR QL: NEGATIVE
PH UR STRIP: 5 (ref 5–8)
PLATELET # BLD AUTO: 363 K/UL (ref 150–400)
PMV BLD AUTO: 9.1 FL (ref 8.9–12.9)
POTASSIUM SERPL-SCNC: 4.1 MMOL/L (ref 3.5–5.1)
PROT SERPL-MCNC: 8.4 G/DL (ref 6.4–8.2)
PROT UR STRIP-MCNC: 30 MG/DL
RBC # BLD AUTO: 5.38 M/UL (ref 4.1–5.7)
RBC #/AREA URNS HPF: ABNORMAL /HPF (ref 0–5)
SODIUM SERPL-SCNC: 141 MMOL/L (ref 136–145)
SP GR UR REFRACTOMETRY: 1.02
UROBILINOGEN UR QL STRIP.AUTO: 1 EU/DL (ref 0.2–1)
WBC # BLD AUTO: 4.3 K/UL (ref 4.1–11.1)
WBC URNS QL MICRO: ABNORMAL /HPF (ref 0–4)

## 2024-12-30 PROCEDURE — 81001 URINALYSIS AUTO W/SCOPE: CPT

## 2024-12-30 PROCEDURE — 80307 DRUG TEST PRSMV CHEM ANLYZR: CPT

## 2024-12-30 PROCEDURE — 80053 COMPREHEN METABOLIC PANEL: CPT

## 2024-12-30 PROCEDURE — 99284 EMERGENCY DEPT VISIT MOD MDM: CPT

## 2024-12-30 PROCEDURE — 85025 COMPLETE CBC W/AUTO DIFF WBC: CPT

## 2024-12-30 PROCEDURE — 6370000000 HC RX 637 (ALT 250 FOR IP): Performed by: EMERGENCY MEDICINE

## 2024-12-30 PROCEDURE — 70450 CT HEAD/BRAIN W/O DYE: CPT

## 2024-12-30 RX ORDER — LORAZEPAM 1 MG/1
0.5 TABLET ORAL
Status: COMPLETED | OUTPATIENT
Start: 2024-12-30 | End: 2024-12-30

## 2024-12-30 RX ADMIN — LORAZEPAM 0.5 MG: 1 TABLET ORAL at 18:30

## 2024-12-30 ASSESSMENT — PAIN - FUNCTIONAL ASSESSMENT: PAIN_FUNCTIONAL_ASSESSMENT: 0-10

## 2024-12-30 ASSESSMENT — PAIN DESCRIPTION - ORIENTATION: ORIENTATION: POSTERIOR

## 2024-12-30 ASSESSMENT — PAIN SCALES - GENERAL: PAINLEVEL_OUTOF10: 4

## 2024-12-30 ASSESSMENT — PAIN DESCRIPTION - DESCRIPTORS: DESCRIPTORS: TENDER

## 2024-12-30 ASSESSMENT — PAIN DESCRIPTION - LOCATION: LOCATION: HEAD

## 2024-12-30 NOTE — ED TRIAGE NOTES
Pt presents to ED via EMS. Per EMS pt arrived at work and had a witnessed seizure. EMS reported on arrival pt was post itcal.       VSS, pt is A&O4, pt speaking in clear and complete sentences and ambulated with steady gait.

## 2024-12-30 NOTE — ED PROVIDER NOTES
[facility] EMERGENCY DEPT  EMERGENCY DEPARTMENT ENCOUNTER       Pt Name: Rosalio Stephenson  MRN: [id number]  Birthdate 1995  Date of evaluation: 12/30/2024  Provider: Maria Alejandra Herrera MD   PCP: Adi Jordan MD  Note Started: 1:03 PM 12/30/24     (Please note that parts of this dictation were completed with voice recognition software. Quite often unanticipated grammatical, syntax, homophones, and other interpretive errors are inadvertently transcribed by the computer software. Please disregards these errors. Please excuse any errors that have escaped final proofreading.)    CHIEF COMPLAINT       Chief Complaint   Patient presents with    Seizures        HISTORY OF PRESENT ILLNESS: 1 or more elements      History From: patient, History limited by:  none     Rosalio Stephenson is a 29 y.o. male with h/o prior seizure in 2019 who presents via ems with recurrent seizure. He was witnessed by coworkers to fall, hit his head, then have generalized seizure activity for 30 seconds. He was post-ictal on EMS arrival.   Patient arrives in his [employer] work uniform and states \"evidently I had a seizure.\"  The last thing he remembers is grabbing a water bottle.  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  He was observed to fall over, hit his head and had a seizure-like episode.  Patient states the next thing he remembers after grabbing a bottle of water is waking up on the ambulance.  He does note that he was sick last week until about 3 days ago with URI symptoms.  Currently describing mild posterior head pain.  Otherwise review of systems negative     Nursing Notes were all reviewed and agreed with or any disagreements were addressed in the HPI.     REVIEW OF SYSTEMS        Positives and Pertinent negatives as per HPI.    PAST HISTORY     Past Medical History:  Past Medical History:   Diagnosis Date    Anxiety     Depression     GERD (gastroesophageal reflux disease)     Seizure ([facility]) 03/2019    x 1, did not

## 2024-12-30 NOTE — ED NOTES
Pt presents to ED complaining of witnessed seizure while at work. Pt reports his last seizure was in 2019, currently does not take medications for seizures. Pt is alert and oriented x 4, RR even and unlabored, skin is warm and dry. Pt appears in NAD at this time. Assessment completed and pt updated on plan of care.  Call bell in reach.     The Nursing Plan of Care is developed from the Nursing assessment and Emergency Department Attending provider initial evaluation.  The plan of care may be reviewed in the “ED Provider note”.    The Plan of Care was developed with the following considerations:  Patient / Family readiness to learn indicated by:verbalized understanding  Persons(s) to be included in education: patient  Barriers to Learning/Limitations:None    Signed    SATISH PALAFOX RN    12/30/2024   4:57 PM

## 2025-01-08 ENCOUNTER — TELEMEDICINE (OUTPATIENT)
Age: 30
End: 2025-01-08
Payer: MEDICAID

## 2025-01-08 DIAGNOSIS — R56.9 SEIZURE-LIKE ACTIVITY (HCC): Primary | ICD-10-CM

## 2025-01-08 DIAGNOSIS — F20.9 SCHIZOPHRENIA, UNSPECIFIED TYPE (HCC): ICD-10-CM

## 2025-01-08 PROCEDURE — 99213 OFFICE O/P EST LOW 20 MIN: CPT | Performed by: STUDENT IN AN ORGANIZED HEALTH CARE EDUCATION/TRAINING PROGRAM

## 2025-01-08 RX ORDER — TRIAMCINOLONE ACETONIDE 5 MG/G
0.5 OINTMENT TOPICAL 2 TIMES DAILY
COMMUNITY
Start: 2024-12-19 | End: 2025-01-09 | Stop reason: SDUPTHER

## 2025-01-08 SDOH — ECONOMIC STABILITY: FOOD INSECURITY: WITHIN THE PAST 12 MONTHS, YOU WORRIED THAT YOUR FOOD WOULD RUN OUT BEFORE YOU GOT MONEY TO BUY MORE.: OFTEN TRUE

## 2025-01-08 SDOH — ECONOMIC STABILITY: FOOD INSECURITY: WITHIN THE PAST 12 MONTHS, THE FOOD YOU BOUGHT JUST DIDN'T LAST AND YOU DIDN'T HAVE MONEY TO GET MORE.: OFTEN TRUE

## 2025-01-08 NOTE — PROGRESS NOTES
[facility]  [address] [place] [address] [phone]    Chief Complaint: ED follow up    Subjective  Rosalio Stephenson is a 29 y.o. Black /  male , established patient, here for evaluation of the concern(s) above;    SUBJECTIVE:     Pt would like to be evaluated for the problem(s) listed above:       Significant PMHx include: prediabetes, Schizophrenia, Seizure, THC use and Anxiety. Here for follow up;    ED follow up:    Pt seen in the ER for evaluation of seizure. States that he was at work when he had the seizure. Pt states that he felt fine until he \"blacked out\". Not clear if anyone witnessed the episodes.   States that he had one seizure in 2019 and this is another episode. Scheduled to see neuro for EEG and MRI but 1st opening is 06/2025 ([facility]) while other locations cannot get him in until 09/2025.    CT head showed no acute Intracranial findings, there was posterior scalp contusion.     Pt denies any  fever, chill, chest pain, SOB, abdominal pain, n/v/d, HA or dizziness.      Other chronic problems     Schizophrenia:  States that he has been out of his Lexapro and Abilify for about a month as the psychiatrist he was seeing no longer work at the practice.     Was on Abilify 10mg, Lexapro 10mg and trazodone which he takes prn for sleep.  Was seeing psych monthly along with therapist (twice monthly). Still sees his therapist.  Denies any suicidal or homicidal ideation.       Other Health Habits and social history:  Smoking history: Marijauna. No cigarette.   Alcohol history: yes, sporadically. 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  Mother is Mrs. Beba Stephenson (pt at practice)        Other Specialists/providers:  Psychiatry/therapist  Review of systems:       A comprehensive review of systems was negative except for that written in the

## 2025-01-08 NOTE — PROGRESS NOTES
Chief Complaint   Patient presents with    Follow-up     2024 Elevated blood pressure reading     \"Have you been to the ER, urgent care clinic since your last visit?  Hospitalized since your last visit?\"      Yes  2024  [facility].  Recurrent seizures     “Have you seen or consulted any other health care providers outside of [facility] since your last visit?”    NO             There were no vitals filed for this visit.   Health Maintenance Due   Topic Date Due    Varicella vaccine (1 of 2 - 13+ 2-dose series) Never done    HIV screen  Never done    Hepatitis C screen  Never done    Hepatitis B vaccine (1 of 3 - 19+ 3-dose series) Never done    DTaP/Tdap/Td vaccine (1 - Tdap) Never done    Flu vaccine (1) Never done    COVID-19 Vaccine (2023- season) Never done      The patient, Rosalio Stephenson, identity was verified by name and , pharmacy verified  Labs:N/A  Fasting:N/A

## 2025-01-08 NOTE — PATIENT INSTRUCTIONS
[facility] Transportation Resources*  (Call [facility]/211 if need additional resources.)    [facility] Transit System  What they offer: Provides public transportation to the [facility] and parts of [place] and [facility].  Website: http://www.ridegrtc.com/  Phone Number: 773.152.3878  [facility] Specialized Services (CARE & CARE Plus)  What they offer: Provides public transportation access to individuals with disabilities who may not reasonably be able to use [facility] fixed route bus service. Provides dyqxmb-bx-uubwqepmydl services under the guidelines of the ADA.  Website: http://Work [employer]n Gear/services/specialized-transportation/care  Phone Number: 934.860.5396  [employer] Ride Connection  What they offer: Ride Connection provides 2 round trip rides/month to non-emergency medical appointments (must qualify)  Website:  https://seniorconnections-[place].org/services/support-to-stay-home/ride-connections/  Phone Number: 190.606.4984  Eligibility Requirements: Individuals with disabilities (18+) or older adults (60+) and live in the [facility] or the [facility] of [place], [facility] and [place].  Sliding scale fee system.  You must give 7 days notice to schedules rides, which are subject to availability.      [employer]  What they offer: Donation based transportation services for veterans, families in need, the elderly, and those with disabilities.  Website: https://www.MeetLinkshare.[employer]mle/  Phone Number: 981.396.5218.  Please allow two weeks notice in scheduling rides, which are subject to availability.    Additional Information: Offers transport to appointments, grocery store, airport, etc. in the areas of [facility], [place], [place], and [place].      [facility] Transit - Community transit service serving [place], [place], [facility], [place], [place], [place], [place], [place],

## 2025-01-09 DIAGNOSIS — F20.9 SCHIZOPHRENIA, UNSPECIFIED TYPE (HCC): ICD-10-CM

## 2025-01-09 DIAGNOSIS — R21 RASH: Primary | ICD-10-CM

## 2025-01-09 RX ORDER — ARIPIPRAZOLE 10 MG/1
10 TABLET ORAL DAILY
Qty: 30 TABLET | Refills: 2 | OUTPATIENT
Start: 2025-01-09

## 2025-01-09 RX ORDER — TRIAMCINOLONE ACETONIDE 5 MG/G
OINTMENT TOPICAL 2 TIMES DAILY
Qty: 15 G | Refills: 0 | Status: CANCELLED | OUTPATIENT
Start: 2025-01-09

## 2025-01-09 RX ORDER — TRIAMCINOLONE ACETONIDE 5 MG/G
OINTMENT TOPICAL
Qty: 15 G | Refills: 1 | Status: SHIPPED | OUTPATIENT
Start: 2025-01-09

## 2025-01-09 RX ORDER — ARIPIPRAZOLE 10 MG/1
10 TABLET ORAL DAILY
Qty: 30 TABLET | Refills: 2 | Status: SHIPPED | OUTPATIENT
Start: 2025-01-09

## 2025-01-09 NOTE — TELEPHONE ENCOUNTER
Patient comment: I know you sent a month's supply to the [facility] along with the cream and the Lexapro but I didn't get it.     Abilify & Lexapro were sent on 12/19/24 for #30 with 2 refills.    Last visit:  1/8/25  Next appt: none    Requested Prescriptions     Pending Prescriptions Disp Refills    triamcinolone (ARISTOCORT) 0.5 % ointment 15 g 0     Sig: Apply topically 2 times daily     Refused Prescriptions Disp Refills    ABILIFY 10 MG tablet 30 tablet 2     Sig: Take 1 tablet by mouth daily       For Pharmacy Admin Tracking Only    Program: Medication Refill  CPA in place:    Recommendation Provided To:   Intervention Detail: Discontinued Rx: 2, reason: Duplicate Therapy and New Rx: 1, reason: Patient Preference  Intervention Accepted By:   Gap Closed?:    Time Spent (min): 5

## 2025-03-17 ENCOUNTER — TELEPHONE (OUTPATIENT)
Age: 30
End: 2025-03-17

## 2025-03-17 NOTE — TELEPHONE ENCOUNTER
Appointment Request From: Rosalio Stephenson     With Provider: Dr. Adi Jordan MD [[facility] MAIN OFFICE[facility]]     Preferred Date Range: 3/25/2025 - 4/16/2025     Preferred Times: Any Time     Reason for visit: Office Visit     Health Maintenance Topic:      Comments:  I have a wart on my finger that has grown and I think it's spreading     Message was left for patient that his message was forwarded to Clinical Staff for review to get scheduled with another Provider  since Dr. Jordan is out of the office.

## 2025-03-18 ENCOUNTER — TELEPHONE (OUTPATIENT)
Age: 30
End: 2025-03-18

## 2025-03-18 NOTE — TELEPHONE ENCOUNTER
----- Message from Mayo ESPINOZA sent at 3/17/2025  4:46 PM EDT -----  Regarding: ECC Escalation To Practice  ECC Escalation To Practice      Type of Escalation: Acute Care Symptom  --------------------------------------------------------------------------------------------------------------------------    Information for Provider:  patient has a whart on his finger and his been having this one for quite a while now and would like to be seen  Patient is looking for appointment for: Symptom Skin problem \ whart  Reasons for Message: Unable to reach practice     Additional Information patient has a whart on his finger and his been having this one for quite a while now and would like to be seen ASAP  --------------------------------------------------------------------------------------------------------------------------    Relationship to Patient: Self  Call Back Info: OK to leave message on voicemail  Preferred Call Back Number: Phone  [phone]

## 2025-03-18 NOTE — TELEPHONE ENCOUNTER
After consulting with Dr. Chioma Montenegro, patient can get an over the counter wart medication remover since it ha been there for a little while. If patient wants to be seen, the appt will not be anytime soon. Would like for patient to try over the counter mediation first. Patient can return call to office with any questions.

## 2025-06-09 ENCOUNTER — TELEPHONE (OUTPATIENT)
Age: 30
End: 2025-06-09

## 2025-06-09 NOTE — TELEPHONE ENCOUNTER
Patient reports foot pain that getting worse, warts starting to spread on hands, STD check.. Patient can be reached at [phone].

## 2025-06-09 NOTE — TELEPHONE ENCOUNTER
Offered patient virtual appt. to get in sooner. Patient stated he has to wait for work schedule to come out on Wed. 6/11/2025 and he will call back to check availability.

## 2025-06-30 ENCOUNTER — OFFICE VISIT (OUTPATIENT)
Age: 30
End: 2025-06-30
Payer: MEDICAID

## 2025-06-30 ENCOUNTER — TELEPHONE (OUTPATIENT)
Age: 30
End: 2025-06-30

## 2025-06-30 VITALS
WEIGHT: 234 LBS | BODY MASS INDEX: 34.66 KG/M2 | OXYGEN SATURATION: 98 % | DIASTOLIC BLOOD PRESSURE: 84 MMHG | SYSTOLIC BLOOD PRESSURE: 136 MMHG | HEART RATE: 78 BPM | HEIGHT: 69 IN

## 2025-06-30 DIAGNOSIS — R06.83 SNORING: ICD-10-CM

## 2025-06-30 DIAGNOSIS — G40.909 SEIZURE DISORDER (HCC): Primary | ICD-10-CM

## 2025-06-30 PROCEDURE — 99204 OFFICE O/P NEW MOD 45 MIN: CPT | Performed by: PSYCHIATRY & NEUROLOGY

## 2025-06-30 ASSESSMENT — PATIENT HEALTH QUESTIONNAIRE - PHQ9
1. LITTLE INTEREST OR PLEASURE IN DOING THINGS: NOT AT ALL
SUM OF ALL RESPONSES TO PHQ QUESTIONS 1-9: 0
2. FEELING DOWN, DEPRESSED OR HOPELESS: NOT AT ALL
SUM OF ALL RESPONSES TO PHQ QUESTIONS 1-9: 0

## 2025-06-30 NOTE — PROGRESS NOTES
Chief Complaint   Patient presents with    Neurologic Problem     Seizures     Vitals:    06/30/25 1351   BP: 136/84   Pulse: 78   SpO2: 98%

## 2025-07-15 ENCOUNTER — OFFICE VISIT (OUTPATIENT)
Age: 30
End: 2025-07-15
Payer: MEDICAID

## 2025-07-15 VITALS
OXYGEN SATURATION: 97 % | SYSTOLIC BLOOD PRESSURE: 126 MMHG | WEIGHT: 227.07 LBS | BODY MASS INDEX: 33.63 KG/M2 | HEIGHT: 69 IN | TEMPERATURE: 97.6 F | DIASTOLIC BLOOD PRESSURE: 75 MMHG | HEART RATE: 83 BPM | RESPIRATION RATE: 17 BRPM

## 2025-07-15 DIAGNOSIS — R73.03 PREDIABETES: ICD-10-CM

## 2025-07-15 DIAGNOSIS — F20.9 SCHIZOPHRENIA, UNSPECIFIED TYPE (HCC): ICD-10-CM

## 2025-07-15 DIAGNOSIS — M21.41 PES PLANUS OF BOTH FEET: Primary | ICD-10-CM

## 2025-07-15 DIAGNOSIS — Z11.3 SCREEN FOR STD (SEXUALLY TRANSMITTED DISEASE): ICD-10-CM

## 2025-07-15 DIAGNOSIS — M21.42 PES PLANUS OF BOTH FEET: Primary | ICD-10-CM

## 2025-07-15 PROCEDURE — 99214 OFFICE O/P EST MOD 30 MIN: CPT | Performed by: STUDENT IN AN ORGANIZED HEALTH CARE EDUCATION/TRAINING PROGRAM

## 2025-07-15 RX ORDER — ESCITALOPRAM OXALATE 10 MG/1
10 TABLET ORAL DAILY
Qty: 90 TABLET | Refills: 1 | Status: SHIPPED | OUTPATIENT
Start: 2025-07-15

## 2025-07-15 RX ORDER — ARIPIPRAZOLE 10 MG/1
10 TABLET ORAL DAILY
Qty: 90 TABLET | Refills: 1 | Status: SHIPPED | OUTPATIENT
Start: 2025-07-15

## 2025-07-15 NOTE — PROGRESS NOTES
Chief Complaint   Patient presents with    Foot Pain     (Both)Left Feels Worse x 1 year    Warts     On both hands  1 on left & 8 on the right hand    STD's Checked     Patient believes it is from a pair of shoes. Denies any injury.  \"Have you been to the ER, urgent care clinic since your last visit?  Hospitalized since your last visit?\"    NO    “Have you seen or consulted any other health care providers outside of [facility] since your last visit?”    NO             Vitals:    07/15/25 1423   BP: 126/75   Pulse: 83   Resp: 17   Temp: 97.6 °F (36.4 °C)   TempSrc: Temporal   SpO2: 97%   Weight: 103 kg (227 lb 1.2 oz)   Height: 1.753 m (5' 9\")      Health Maintenance Due   Topic Date Due    Varicella vaccine (1 of 2 - 13+ 2-dose series) Never done    HIV screen  Never done    Hepatitis C screen  Never done    Hepatitis B vaccine (1 of 3 - 19+ 3-dose series) Never done    DTaP/Tdap/Td vaccine (1 - Tdap) Never done    COVID-19 Vaccine ( - - season) Never done    A1C test (Diabetic or Prediabetic)  2025      The patient, Rosalio Stephenson, identity was verified by name and , pharmacy verified  Labs:Yes  Fasting:No

## 2025-07-15 NOTE — ASSESSMENT & PLAN NOTE
-NSAIDs or tylenol as needed for pain  -recommend comfortable foot wears  -recommend orthotic shoe inserts or other walking supports  -home feet stretching exercises as demonstrated

## 2025-07-15 NOTE — PROGRESS NOTES
[facility]  [address] [place] [place] [place] [address] [phone]    C/C: Acute/chronic medical problems    Subjective  Rosalio Stephenson is a 30 y.o. Black /  male , established patient, here for evaluation of the concern(s) below;    Significant PMHx include: prediabetes, Schizophrenia, Seizure, THC use and Anxiety.     Acute concerns:    Bilateral feet pain:  Ongoing for at least a year.  States that he changed shoes and it improved but then started hurting after the shoe gone worn out.  States that now that he changed his shoes, the pain have improved.  Pain mainly when at work for long hours standing.      STD screening   Pt presents for evaluation of possible STD infection. Pt reports having unprotected sex with his ex-girlfriend a while back and concerned that he must have had an std but never went for check up.     Schizophrenia:  Was on Abilify 10mg, Lexapro 10mg and trazodone which he takes prn for sleep.  Was seeing psych monthly along with therapist (twice monthly). Still sees his therapist. Psychiatrist he was seeing no longer work at the practice.  Denies any suicidal or homicidal ideation.    Other chronic medical problems (not addressed today)    Seizure-like activity:  States that he had one seizure in 2019 and another episode around 12/2024. Scheduled to see neuro.     Other Health Habits and social history:  Smoking history: Marijauna. No cigarette.   Alcohol history: yes, sporadically. 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  Mother is Mrs. Beba Stephenson (pt at practice)        Other Specialists/providers:  Psychiatry/therapist    Allergies - reviewed:   Allergies   Allergen Reactions    Other Other (See Comments)     Pt reports allergies to pollen & dust, pt unsure of reaction       Past Medical History - reviewed:  Past Medical History:

## 2025-07-16 LAB
BUN SERPL-MCNC: 10 MG/DL (ref 6–20)
BUN/CREAT SERPL: 9 (ref 9–20)
CALCIUM SERPL-MCNC: 10 MG/DL (ref 8.7–10.2)
CHLORIDE SERPL-SCNC: 104 MMOL/L (ref 96–106)
CO2 SERPL-SCNC: 19 MMOL/L (ref 20–29)
CREAT SERPL-MCNC: 1.08 MG/DL (ref 0.76–1.27)
EGFRCR SERPLBLD CKD-EPI 2021: 95 ML/MIN/1.73
EST. AVERAGE GLUCOSE BLD GHB EST-MCNC: 117 MG/DL
GLUCOSE SERPL-MCNC: 85 MG/DL (ref 70–99)
HBA1C MFR BLD: 5.7 % (ref 4.8–5.6)
HCV IGG SERPL QL IA: NON REACTIVE
HIV 1+2 AB+HIV1 P24 AG SERPL QL IA: NON REACTIVE
POTASSIUM SERPL-SCNC: 4.4 MMOL/L (ref 3.5–5.2)
RPR SER QL: NON REACTIVE
SODIUM SERPL-SCNC: 139 MMOL/L (ref 134–144)

## 2025-08-12 ENCOUNTER — TELEPHONE (OUTPATIENT)
Age: 30
End: 2025-08-12

## 2025-08-14 PROBLEM — Z11.3 SCREEN FOR STD (SEXUALLY TRANSMITTED DISEASE): Status: RESOLVED | Noted: 2025-07-15 | Resolved: 2025-08-14

## (undated) DEVICE — COVER,MAYO STAND,STERILE: Brand: MEDLINE

## (undated) DEVICE — COLUMN DRAPE

## (undated) DEVICE — SUTURE VCRL SZ 3-0 L27IN ABSRB UD L26MM SH 1/2 CIR J416H

## (undated) DEVICE — SUT STRATA PDS+ 15CM SZ 3-0 SH -- STRATAFIX

## (undated) DEVICE — GENERAL LAPAROSCOPY-MRMC: Brand: MEDLINE INDUSTRIES, INC.

## (undated) DEVICE — SEAL UNIV 5-8MM DISP BX/10 -- DA VINCI XI - SNGL USE

## (undated) DEVICE — TOWEL SURG W17XL27IN STD BLU COT NONFENESTRATED PREWASHED

## (undated) DEVICE — GARMENT,MEDLINE,DVT,INT,CALF,MED, GEN2: Brand: MEDLINE

## (undated) DEVICE — VISUALIZATION SYSTEM: Brand: CLEARIFY

## (undated) DEVICE — 3M™ IOBAN™ 2 ANTIMICROBIAL INCISE DRAPE 6651EZ: Brand: IOBAN™ 2

## (undated) DEVICE — COVER MPLR TIP CRV SCIS ACC DA VINCI

## (undated) DEVICE — ARM DRAPE

## (undated) DEVICE — BLADELESS OBTURATOR: Brand: WECK VISTA

## (undated) DEVICE — SUTURE PDS II SZ 3-0 L27IN ABSRB VLT L26MM SH 1/2 CIR Z316H

## (undated) DEVICE — HYPODERMIC SAFETY NEEDLE: Brand: MAGELLAN

## (undated) DEVICE — SUTURE MCRYL SZ 4-0 L27IN ABSRB UD L19MM PS-2 1/2 CIR PRIM Y426H

## (undated) DEVICE — AIRSEAL SINGLE LUMEN FILTERED TUBE SET: Brand: AIRSEAL

## (undated) DEVICE — CLIP INT M L POLYMER LOK LIG HEM O LOK

## (undated) DEVICE — SUTURE ABSORBABLE BRAIDED 3-0 SHB 18 IN UD VICRYL + VCPB864D